# Patient Record
Sex: FEMALE | Race: WHITE | NOT HISPANIC OR LATINO | Employment: UNEMPLOYED | URBAN - METROPOLITAN AREA
[De-identification: names, ages, dates, MRNs, and addresses within clinical notes are randomized per-mention and may not be internally consistent; named-entity substitution may affect disease eponyms.]

---

## 2017-11-07 ENCOUNTER — TELEPHONE (OUTPATIENT)
Dept: PRIMARY CARE CLINIC | Facility: CLINIC | Age: 22
End: 2017-11-07

## 2017-11-07 NOTE — TELEPHONE ENCOUNTER
----- Message from Anjana Grande sent at 11/7/2017 10:17 AM CST -----  Contact: Patient  Jolene, patient [phone], Calling for same day appointment, cough, nausea, dizzy. Please advise. Thanks.

## 2019-01-14 LAB
ABO + RH BLD: NORMAL
C TRACH RRNA SPEC QL PROBE: NEGATIVE
HBV SURFACE AG SERPL QL IA: NEGATIVE
HIV 1+2 AB+HIV1 P24 AG SERPL QL IA: NEGATIVE
INDIRECT COOMBS: NEGATIVE
N GONORRHOEAE, AMPLIFIED DNA: NEGATIVE
RPR: NONREACTIVE
RUBELLA IMMUNE STATUS: NORMAL

## 2019-08-19 ENCOUNTER — HOSPITAL ENCOUNTER (OUTPATIENT)
Facility: HOSPITAL | Age: 24
Discharge: HOME | End: 2019-08-19
Attending: OBSTETRICS & GYNECOLOGY | Admitting: OBSTETRICS & GYNECOLOGY
Payer: COMMERCIAL

## 2019-08-19 VITALS — SYSTOLIC BLOOD PRESSURE: 117 MMHG | HEART RATE: 90 BPM | DIASTOLIC BLOOD PRESSURE: 73 MMHG

## 2019-08-19 DIAGNOSIS — O26.899 ABDOMINAL PAIN AFFECTING PREGNANCY: ICD-10-CM

## 2019-08-19 DIAGNOSIS — R10.9 ABDOMINAL PAIN AFFECTING PREGNANCY: ICD-10-CM

## 2019-08-19 LAB
BACTERIA #/AREA URNS HPF: ABNORMAL /HPF
BILIRUB UR QL STRIP: NEGATIVE
CLARITY UR: CLEAR
COLOR UR: YELLOW
CTP QC/QA: YES
GLUCOSE UR QL STRIP: NEGATIVE
HGB UR QL STRIP: NEGATIVE
HYALINE CASTS #/AREA URNS LPF: 2 /LPF
KETONES UR QL STRIP: NEGATIVE
LEUKOCYTE ESTERASE UR QL STRIP: ABNORMAL
MICROSCOPIC COMMENT: ABNORMAL
NITRITE UR QL STRIP: NEGATIVE
PH UR STRIP: 7 [PH] (ref 5–8)
PROT UR QL STRIP: NEGATIVE
RBC #/AREA URNS HPF: 2 /HPF (ref 0–4)
RUPTURE OF MEMBRANE: NEGATIVE
SP GR UR STRIP: 1 (ref 1–1.03)
SQUAMOUS #/AREA URNS HPF: 3 /HPF
URN SPEC COLLECT METH UR: ABNORMAL
UROBILINOGEN UR STRIP-ACNC: NEGATIVE EU/DL
WBC #/AREA URNS HPF: 4 /HPF (ref 0–5)

## 2019-08-19 PROCEDURE — 96372 THER/PROPH/DIAG INJ SC/IM: CPT

## 2019-08-19 PROCEDURE — 63600175 PHARM REV CODE 636 W HCPCS: Performed by: OBSTETRICS & GYNECOLOGY

## 2019-08-19 PROCEDURE — 81001 URINALYSIS AUTO W/SCOPE: CPT

## 2019-08-19 PROCEDURE — 99211 OFF/OP EST MAY X REQ PHY/QHP: CPT | Mod: 25

## 2019-08-19 RX ORDER — FEXOFENADINE HCL 60 MG
60 TABLET ORAL DAILY
Status: ON HOLD | COMMUNITY
End: 2019-09-14 | Stop reason: HOSPADM

## 2019-08-19 RX ORDER — TERBUTALINE SULFATE 1 MG/ML
0.25 INJECTION SUBCUTANEOUS ONCE
Status: COMPLETED | OUTPATIENT
Start: 2019-08-19 | End: 2019-08-19

## 2019-08-19 RX ADMIN — TERBUTALINE SULFATE 0.25 MG: 1 INJECTION, SOLUTION SUBCUTANEOUS at 12:08

## 2019-08-19 NOTE — NURSING
1040- Pt presents to unit with c/o abd pain and back pain off and on since Saturday night. Oriented to 2301.     1047- pt states since her baby shower on Saturday, she has been experiencing back pain and abd cramping that seems to be more severe in the mornings. Pt also reports occasional leaking. Denies vaginal bleeding and reports fetal movement.     1053- ROM plus performed. Pt tolerated well.     1113- ROM plus negative.     1140- U/A results negative.     1222- Brethine given. Pt tolerated well.     1313- Dr. Mead on unit to review strip. Status update given. MD states ok to discharge.     1318- Discharge instructions reviewed with and signed by patient. Labor precautions given. Pt ambulated off unit with  in no apparent distress.

## 2019-08-19 NOTE — DISCHARGE INSTRUCTIONS
Call your Doctor if you have any of the following:  Temperature above 100 degrees  Nausea, vomiting and/or diarrhea  Severe headache, dizziness, or blurred vision  Notable increase in swelling of hands or feet  Notable swelling of face and lips  Difficulty, pain or burning with urination  Foul smelling vaginal discharge  Decreased fetal movement    Come to the hospital if you have any of the following symptoms:  Your water breaks  More than 4-6 contractions in 1 hour for 2 or more hours  Vaginal bleeding like a period    After 28 weeks, you should feel 10 distinct fetal movements within a 2 hour period.    It is recommended that you drink 1/2 a gallon of water each day.  Tea, Soda and Juice are  in addition to this.

## 2019-09-04 ENCOUNTER — HOSPITAL ENCOUNTER (OUTPATIENT)
Facility: HOSPITAL | Age: 24
Discharge: HOME | End: 2019-09-04
Attending: OBSTETRICS & GYNECOLOGY | Admitting: OBSTETRICS & GYNECOLOGY
Payer: COMMERCIAL

## 2019-09-04 VITALS — SYSTOLIC BLOOD PRESSURE: 107 MMHG | DIASTOLIC BLOOD PRESSURE: 66 MMHG | HEART RATE: 77 BPM

## 2019-09-04 DIAGNOSIS — R03.0 ELEVATED BP WITHOUT DIAGNOSIS OF HYPERTENSION: ICD-10-CM

## 2019-09-04 LAB
ALBUMIN SERPL BCP-MCNC: 3.2 G/DL (ref 3.5–5.2)
ALP SERPL-CCNC: 147 U/L (ref 55–135)
ALT SERPL W/O P-5'-P-CCNC: 36 U/L (ref 10–44)
ANION GAP SERPL CALC-SCNC: 10 MMOL/L (ref 8–16)
AST SERPL-CCNC: 26 U/L (ref 10–40)
BACTERIA #/AREA URNS HPF: ABNORMAL /HPF
BILIRUB SERPL-MCNC: 0.4 MG/DL (ref 0.1–1)
BILIRUB UR QL STRIP: NEGATIVE
BUN SERPL-MCNC: 7 MG/DL (ref 6–20)
CALCIUM SERPL-MCNC: 8.9 MG/DL (ref 8.7–10.5)
CHLORIDE SERPL-SCNC: 105 MMOL/L (ref 95–110)
CLARITY UR: ABNORMAL
CO2 SERPL-SCNC: 21 MMOL/L (ref 23–29)
COLOR UR: YELLOW
CREAT SERPL-MCNC: 0.5 MG/DL (ref 0.5–1.4)
ERYTHROCYTE [DISTWIDTH] IN BLOOD BY AUTOMATED COUNT: 14.3 % (ref 11.5–14.5)
EST. GFR  (AFRICAN AMERICAN): >60 ML/MIN/1.73 M^2
EST. GFR  (NON AFRICAN AMERICAN): >60 ML/MIN/1.73 M^2
GLUCOSE SERPL-MCNC: 80 MG/DL (ref 70–110)
GLUCOSE UR QL STRIP: NEGATIVE
HCT VFR BLD AUTO: 32.8 % (ref 37–48.5)
HGB BLD-MCNC: 11.1 G/DL (ref 12–16)
HGB UR QL STRIP: NEGATIVE
HYALINE CASTS #/AREA URNS LPF: 6 /LPF
KETONES UR QL STRIP: NEGATIVE
LEUKOCYTE ESTERASE UR QL STRIP: ABNORMAL
MCH RBC QN AUTO: 32.1 PG (ref 27–31)
MCHC RBC AUTO-ENTMCNC: 33.8 G/DL (ref 32–36)
MCV RBC AUTO: 95 FL (ref 82–98)
MICROSCOPIC COMMENT: ABNORMAL
NITRITE UR QL STRIP: NEGATIVE
PH UR STRIP: 7 [PH] (ref 5–8)
PLATELET # BLD AUTO: 204 K/UL (ref 150–350)
PMV BLD AUTO: 11 FL (ref 9.2–12.9)
POTASSIUM SERPL-SCNC: 3.9 MMOL/L (ref 3.5–5.1)
PROT SERPL-MCNC: 6.5 G/DL (ref 6–8.4)
PROT UR QL STRIP: NEGATIVE
RBC # BLD AUTO: 3.46 M/UL (ref 4–5.4)
RBC #/AREA URNS HPF: 1 /HPF (ref 0–4)
SODIUM SERPL-SCNC: 136 MMOL/L (ref 136–145)
SP GR UR STRIP: 1 (ref 1–1.03)
SQUAMOUS #/AREA URNS HPF: 10 /HPF
URN SPEC COLLECT METH UR: ABNORMAL
UROBILINOGEN UR STRIP-ACNC: NEGATIVE EU/DL
WBC # BLD AUTO: 12.23 K/UL (ref 3.9–12.7)
WBC #/AREA URNS HPF: 17 /HPF (ref 0–5)

## 2019-09-04 PROCEDURE — 36415 COLL VENOUS BLD VENIPUNCTURE: CPT

## 2019-09-04 PROCEDURE — 59025 FETAL NON-STRESS TEST: CPT

## 2019-09-04 PROCEDURE — 80053 COMPREHEN METABOLIC PANEL: CPT

## 2019-09-04 PROCEDURE — 85027 COMPLETE CBC AUTOMATED: CPT

## 2019-09-04 PROCEDURE — 81001 URINALYSIS AUTO W/SCOPE: CPT

## 2019-09-04 NOTE — NURSING
D/C instructions reviewed with pt and her S.O. including special emphasis on preE precautions. Pt and S.O. both verbalize understanding and deny questions at this time. Pt ambulated off unit in no apparent distress.    TANIA Hines RN   09/04/2019 11:54 AM

## 2019-09-04 NOTE — NURSING
0977--arrived to labor and delivery after being sent from office for elevated b/p at her OB visit today.  Orders for nst, labs and b/p monitoring. Denies any pre-e symtoms. Has had headaches the last week that were resolved with tylenol. Pt has history of sinus surgery x2.     1004--phlebotomist at bedside to draw cbc and cmp per md order. ua also sent to lab.     1055--called lab to check on cbc, cmp and ua.  stated that she just got the STAT labs in front of her. ua is running after being sent at 1000    1131--lab results and b/p given to Dr Mead via text.    5949--may d/c home per Dr Mead

## 2019-09-12 ENCOUNTER — HOSPITAL ENCOUNTER (INPATIENT)
Facility: HOSPITAL | Age: 24
LOS: 2 days | Discharge: HOME | End: 2019-09-14
Attending: OBSTETRICS & GYNECOLOGY | Admitting: OBSTETRICS & GYNECOLOGY
Payer: COMMERCIAL

## 2019-09-12 ENCOUNTER — ANESTHESIA EVENT (OUTPATIENT)
Dept: OBSTETRICS AND GYNECOLOGY | Facility: HOSPITAL | Age: 24
End: 2019-09-12
Payer: COMMERCIAL

## 2019-09-12 ENCOUNTER — ANESTHESIA (OUTPATIENT)
Dept: OBSTETRICS AND GYNECOLOGY | Facility: HOSPITAL | Age: 24
End: 2019-09-12
Payer: COMMERCIAL

## 2019-09-12 DIAGNOSIS — O42.90 PROM (PREMATURE RUPTURE OF MEMBRANES): ICD-10-CM

## 2019-09-12 LAB
ABO GROUP BLD: NORMAL
BASOPHILS # BLD AUTO: 0.04 K/UL (ref 0–0.2)
BASOPHILS NFR BLD: 0.3 % (ref 0–1.9)
BLD GP AB SCN CELLS X3 SERPL QL: NORMAL
DIFFERENTIAL METHOD: ABNORMAL
EOSINOPHIL # BLD AUTO: 0.1 K/UL (ref 0–0.5)
EOSINOPHIL NFR BLD: 0.5 % (ref 0–8)
ERYTHROCYTE [DISTWIDTH] IN BLOOD BY AUTOMATED COUNT: 14.2 % (ref 11.5–14.5)
HCT VFR BLD AUTO: 37.7 % (ref 37–48.5)
HGB BLD-MCNC: 12.4 G/DL (ref 12–16)
IMM GRANULOCYTES # BLD AUTO: 0.1 K/UL (ref 0–0.04)
IMM GRANULOCYTES NFR BLD AUTO: 0.8 % (ref 0–0.5)
LYMPHOCYTES # BLD AUTO: 1.6 K/UL (ref 1–4.8)
LYMPHOCYTES NFR BLD: 11.9 % (ref 18–48)
MCH RBC QN AUTO: 31.5 PG (ref 27–31)
MCHC RBC AUTO-ENTMCNC: 32.9 G/DL (ref 32–36)
MCV RBC AUTO: 96 FL (ref 82–98)
MONOCYTES # BLD AUTO: 0.8 K/UL (ref 0.3–1)
MONOCYTES NFR BLD: 6 % (ref 4–15)
NEUTROPHILS # BLD AUTO: 10.7 K/UL (ref 1.8–7.7)
NEUTROPHILS NFR BLD: 80.5 % (ref 38–73)
NRBC BLD-RTO: 0 /100 WBC
PLATELET # BLD AUTO: 202 K/UL (ref 150–350)
PMV BLD AUTO: 11.6 FL (ref 9.2–12.9)
RBC # BLD AUTO: 3.94 M/UL (ref 4–5.4)
RH BLD: NORMAL
RPR SER QL: NORMAL
WBC # BLD AUTO: 13.24 K/UL (ref 3.9–12.7)

## 2019-09-12 PROCEDURE — 63600175 PHARM REV CODE 636 W HCPCS: Performed by: ANESTHESIOLOGY

## 2019-09-12 PROCEDURE — 12000002 HC ACUTE/MED SURGE SEMI-PRIVATE ROOM

## 2019-09-12 PROCEDURE — 86850 RBC ANTIBODY SCREEN: CPT

## 2019-09-12 PROCEDURE — 86592 SYPHILIS TEST NON-TREP QUAL: CPT

## 2019-09-12 PROCEDURE — 72200004 HC VAGINAL DELIVERY LEVEL I

## 2019-09-12 PROCEDURE — 62326 NJX INTERLAMINAR LMBR/SAC: CPT | Performed by: ANESTHESIOLOGY

## 2019-09-12 PROCEDURE — 85025 COMPLETE CBC W/AUTO DIFF WBC: CPT

## 2019-09-12 PROCEDURE — 25000003 PHARM REV CODE 250: Performed by: OBSTETRICS & GYNECOLOGY

## 2019-09-12 PROCEDURE — 86900 BLOOD TYPING SEROLOGIC ABO: CPT

## 2019-09-12 PROCEDURE — 27000676 HC TUBING PRIMARY PLUMSET: Performed by: ANESTHESIOLOGY

## 2019-09-12 PROCEDURE — C1751 CATH, INF, PER/CENT/MIDLINE: HCPCS | Performed by: ANESTHESIOLOGY

## 2019-09-12 PROCEDURE — 86901 BLOOD TYPING SEROLOGIC RH(D): CPT

## 2019-09-12 PROCEDURE — 63600175 PHARM REV CODE 636 W HCPCS: Performed by: OBSTETRICS & GYNECOLOGY

## 2019-09-12 RX ORDER — EPHEDRINE SULFATE 50 MG/ML
5 INJECTION, SOLUTION INTRAVENOUS ONCE AS NEEDED
Status: DISCONTINUED | OUTPATIENT
Start: 2019-09-12 | End: 2019-09-12

## 2019-09-12 RX ORDER — NALOXONE HCL 0.4 MG/ML
0.4 VIAL (ML) INJECTION SEE ADMIN INSTRUCTIONS
Status: DISCONTINUED | OUTPATIENT
Start: 2019-09-12 | End: 2019-09-12

## 2019-09-12 RX ORDER — IBUPROFEN 400 MG/1
800 TABLET ORAL EVERY 6 HOURS PRN
Status: DISCONTINUED | OUTPATIENT
Start: 2019-09-12 | End: 2019-09-14 | Stop reason: HOSPADM

## 2019-09-12 RX ORDER — DIPHENHYDRAMINE HYDROCHLORIDE 50 MG/ML
12.5 INJECTION INTRAMUSCULAR; INTRAVENOUS EVERY 4 HOURS PRN
Status: DISCONTINUED | OUTPATIENT
Start: 2019-09-12 | End: 2019-09-13

## 2019-09-12 RX ORDER — FENTANYL/BUPIVACAINE/NS/PF 2-625MCG/1
PLASTIC BAG, INJECTION (ML) INJECTION CONTINUOUS
Status: DISCONTINUED | OUTPATIENT
Start: 2019-09-12 | End: 2019-09-12

## 2019-09-12 RX ORDER — SODIUM CHLORIDE, SODIUM LACTATE, POTASSIUM CHLORIDE, CALCIUM CHLORIDE 600; 310; 30; 20 MG/100ML; MG/100ML; MG/100ML; MG/100ML
INJECTION, SOLUTION INTRAVENOUS CONTINUOUS
Status: DISCONTINUED | OUTPATIENT
Start: 2019-09-12 | End: 2019-09-12

## 2019-09-12 RX ORDER — ROPIVACAINE HYDROCHLORIDE 2 MG/ML
INJECTION, SOLUTION EPIDURAL; INFILTRATION
Status: DISCONTINUED | OUTPATIENT
Start: 2019-09-12 | End: 2019-09-12

## 2019-09-12 RX ORDER — ONDANSETRON 4 MG/1
8 TABLET, ORALLY DISINTEGRATING ORAL EVERY 8 HOURS PRN
Status: DISCONTINUED | OUTPATIENT
Start: 2019-09-12 | End: 2019-09-14 | Stop reason: HOSPADM

## 2019-09-12 RX ORDER — MISOPROSTOL 100 UG/1
600 TABLET ORAL
Status: DISCONTINUED | OUTPATIENT
Start: 2019-09-12 | End: 2019-09-13

## 2019-09-12 RX ORDER — SIMETHICONE 80 MG
1 TABLET,CHEWABLE ORAL 4 TIMES DAILY PRN
Status: DISCONTINUED | OUTPATIENT
Start: 2019-09-12 | End: 2019-09-13

## 2019-09-12 RX ORDER — DIPHENHYDRAMINE HCL 25 MG
25 CAPSULE ORAL ONCE
Status: COMPLETED | OUTPATIENT
Start: 2019-09-12 | End: 2019-09-12

## 2019-09-12 RX ORDER — SODIUM CHLORIDE 9 MG/ML
INJECTION, SOLUTION INTRAVENOUS
Status: DISCONTINUED | OUTPATIENT
Start: 2019-09-12 | End: 2019-09-12

## 2019-09-12 RX ORDER — ONDANSETRON 2 MG/ML
4 INJECTION INTRAMUSCULAR; INTRAVENOUS ONCE
Status: DISCONTINUED | OUTPATIENT
Start: 2019-09-12 | End: 2019-09-13

## 2019-09-12 RX ORDER — CALCIUM CARBONATE 200(500)MG
500 TABLET,CHEWABLE ORAL 3 TIMES DAILY PRN
Status: DISCONTINUED | OUTPATIENT
Start: 2019-09-12 | End: 2019-09-13

## 2019-09-12 RX ORDER — OXYTOCIN-SODIUM CHLORIDE 0.9% IV SOLN 30 UNIT/500ML 30-0.9/5 UT/ML-%
2 SOLUTION INTRAVENOUS CONTINUOUS
Status: DISCONTINUED | OUTPATIENT
Start: 2019-09-12 | End: 2019-09-12

## 2019-09-12 RX ADMIN — ROPIVACAINE HYDROCHLORIDE 3 ML: 2 INJECTION, SOLUTION EPIDURAL; INFILTRATION at 06:09

## 2019-09-12 RX ADMIN — SODIUM CHLORIDE, SODIUM LACTATE, POTASSIUM CHLORIDE, CALCIUM CHLORIDE 1200 ML/HR: 600; 310; 30; 20 INJECTION, SOLUTION INTRAVENOUS at 05:09

## 2019-09-12 RX ADMIN — SODIUM CHLORIDE, SODIUM LACTATE, POTASSIUM CHLORIDE, AND CALCIUM CHLORIDE: .6; .31; .03; .02 INJECTION, SOLUTION INTRAVENOUS at 01:09

## 2019-09-12 RX ADMIN — DIPHENHYDRAMINE HYDROCHLORIDE 25 MG: 25 CAPSULE ORAL at 01:09

## 2019-09-12 RX ADMIN — ROPIVACAINE HYDROCHLORIDE 4 ML: 2 INJECTION, SOLUTION EPIDURAL; INFILTRATION at 06:09

## 2019-09-12 RX ADMIN — ROPIVACAINE HYDROCHLORIDE 2 ML: 2 INJECTION, SOLUTION EPIDURAL; INFILTRATION at 06:09

## 2019-09-12 RX ADMIN — SODIUM CHLORIDE, SODIUM LACTATE, POTASSIUM CHLORIDE, AND CALCIUM CHLORIDE 1000 ML: .6; .31; .03; .02 INJECTION, SOLUTION INTRAVENOUS at 05:09

## 2019-09-12 RX ADMIN — Medication 2 MILLI-UNITS/MIN: at 04:09

## 2019-09-12 RX ADMIN — IBUPROFEN 800 MG: 400 TABLET, FILM COATED ORAL at 08:09

## 2019-09-12 RX ADMIN — VANCOMYCIN HYDROCHLORIDE 1500 MG: 1 INJECTION, POWDER, LYOPHILIZED, FOR SOLUTION INTRAVENOUS at 11:09

## 2019-09-12 NOTE — ANESTHESIA PROCEDURE NOTES
Epidural    Patient location during procedure: OB   Reason for block: primary anesthetic   Diagnosis: Intrauterine pregnancy   Start time: 9/12/2019 5:57 PM  Timeout: 9/12/2019 5:56 PM  End time: 9/12/2019 6:15 PM    Staffing  Performing Provider: Alan Delgado Jr., MD  Authorizing Provider: Alan Delgado Jr., MD        Preanesthetic Checklist  Completed: patient identified, site marked, surgical consent, pre-op evaluation, timeout performed, IV checked, risks and benefits discussed, monitors and equipment checked, anesthesia consent given, hand hygiene performed and patient being monitored  Preparation  Patient position: sitting  Prep: Betadine and ChloraPrep  Patient monitoring: ECG and Blood Pressure  Epidural  Skin Anesthetic: lidocaine 1%  Skin Wheal: 3 mL  Administration type: continuous  Approach: midline  Interspace: L3-4    Injection technique: DELIA air  Needle and Epidural Catheter  Needle type: Tuohy   Needle gauge: 17  Needle length: 3.5 inches  Catheter type: springwound and multi-orifice  Catheter size: 19 G  Test dose: 3 mL of lidocaine 1.5% with Epi 1-to-200,000  Additional Documentation: incremental injection, negative aspiration for heme and CSF, no paresthesia on injection, no significant pain on injection, no significant complaints from patient and no signs/symptoms of IV or SA injection  Needle localization: anatomical landmarks  Assessment  Upper dermatomal levels - Left: T6  Right: T6   Dermatomal levels determined by pinch or prick  Ease of block: easy  Patient's tolerance of the procedure: no complaints and comfortable throughout block  Additional Notes  Unable to inject 1st epidural cath. Procedure repeated without difficulty with new cath inserted. Dosed as charted. No inadvertent dural puncture with Tuohy.

## 2019-09-12 NOTE — SUBJECTIVE & OBJECTIVE
Interval History:  Jolene is a 23 y.o.  at 38w2d. She is doing well. Patient admitted for spontaneous labor and SROM.  Feeling some pain with contractions, pt has not requested epidural yet.      Objective:     Vital Signs (Most Recent):  Temp: 97.9 °F (36.6 °C) (19 1029)  Pulse: 93 (19 1029)  Resp: 16 (19 1029)  BP: 132/73 (19 1029) Vital Signs (24h Range):  Temp:  [97.9 °F (36.6 °C)] 97.9 °F (36.6 °C)  Pulse:  [93] 93  Resp:  [16] 16  BP: (132)/(73) 132/73     Weight: 70.3 kg (155 lb)  Body mass index is 24.28 kg/m².    FHT: Cat 1 (reassuring)  TOCO:  Q 5 minutes    Cervical Exam:  Dilation:  5  Effacement:  85  Station: -2  Presentation: Vertex     Significant Labs:  Lab Results   Component Value Date    GROUPTRH A POS 2019    HEPBSAG Negative 2019       I have personallly reviewed all pertinent lab results from the last 24 hours.    Physical Exam

## 2019-09-12 NOTE — PROGRESS NOTES
VANCOMYCIN PHARMACOKINETIC NOTE:  Vancomycin Day # 1    Objective/Assessment:    Diagnosis/Indication for Vancomycin: Surgical Prophylaxis     23 y.o., female; Actual Body Weight = 70.3 kg (155 lb).    The patient has the following labs:     9/12/2019 CrCl cannot be calculated (Patient's most recent lab result is older than the maximum 7 days allowed.). Lab Results   Component Value Date    BUN 7 09/04/2019       Lab Results   Component Value Date    WBC 12.23 09/04/2019            Plan:@  Adjust vancomycin dose and/or frequency based on the patient's actual weight and renal function:  Initiate Vancomycin 1500 mg IV x1 dose then 125-0 mg IV every 12 hours.  Orders have been entered into patient's chart.        Vancomycin trough level has been ordered for 9/13 @ 23:00    Pharmacy will manage vancomycin therapy, monitor serum vancomycin levels, monitor renal function and adjust regimen as necessary.      Thank you for allowing us to participate in this patient's care.     Yennifer Ortega 9/12/2019 10:49 AM  Department of Pharmacy  Ext [phone]

## 2019-09-12 NOTE — ANESTHESIA PREPROCEDURE EVALUATION
09/12/2019  Jolene Escalante is a 23 y.o., female.    Pre-op Assessment    I have reviewed the Patient Summary Reports.    I have reviewed the Nursing Notes.   I have reviewed the Medications.     Review of Systems  Anesthesia Hx:  No problems with previous Anesthesia Denies Hx of Anesthetic complications  Neg history of prior surgery. Denies Family Hx of Anesthesia complications.   Denies Personal Hx of Anesthesia complications.   Social:  Non-Smoker, No Alcohol Use    Hematology/Oncology:  Hematology Normal   Oncology Normal     EENT/Dental:EENT/Dental Normal   Cardiovascular:  Cardiovascular Normal     Pulmonary:  Pulmonary Normal    Renal/:  Renal/ Normal     Hepatic/GI:  Hepatic/GI Normal    Musculoskeletal:  Musculoskeletal Normal    Neurological:   Thoracic outlet syndrome with left arm pain.   Endocrine:  Endocrine Normal    Dermatological:  Skin Normal    Psych:  Psychiatric Normal           Physical Exam  General:  Well nourished    Airway/Jaw/Neck:  Airway Findings: Mouth Opening: Normal Tongue: Normal  General Airway Assessment: Adult  Mallampati: II  Improves to II with phonation.  TM Distance: Normal, at least 6 cm  Jaw/Neck Findings:  Neck ROM: Normal ROM       Chest/Lungs:  Chest/Lungs Findings: Clear to auscultation, Normal Respiratory Rate     Heart/Vascular:  Heart Findings: Rate: Normal  Rhythm: Regular Rhythm  Sounds: Normal        Mental Status:  Mental Status Findings:  Cooperative, Alert and Oriented       Patient Active Problem List   Diagnosis    Arm pain    Abdominal pain affecting pregnancy    Elevated BP without diagnosis of hypertension    PROM (premature rupture of membranes)       No results found for this or any previous visit.     Lab Results   Component Value Date    WBC 13.24 (H) 09/12/2019    HGB 12.4 09/12/2019    HCT 37.7 09/12/2019    MCV 96 09/12/2019      09/12/2019     BMP  Lab Results   Component Value Date     09/04/2019    K 3.9 09/04/2019     09/04/2019    CO2 21 (L) 09/04/2019    BUN 7 09/04/2019    CREATININE 0.5 09/04/2019    CALCIUM 8.9 09/04/2019    ANIONGAP 10 09/04/2019    ESTGFRAFRICA >60.0 09/04/2019    EGFRNONAA >60.0 09/04/2019           Anesthesia Plan  Type of Anesthesia, risks & benefits discussed:  Anesthesia Type:  epidural  Patient's Preference:   Intra-op Monitoring Plan: standard ASA monitors  Intra-op Monitoring Plan Comments:   Post Op Pain Control Plan:   Post Op Pain Control Plan Comments:   Induction:    Beta Blocker:  Patient is not currently on a Beta-Blocker (No further documentation required).       Informed Consent: Patient understands risks and agrees with Anesthesia plan.  Questions answered. Anesthesia consent signed with patient.  ASA Score: 2     Day of Surgery Review of History & Physical:

## 2019-09-12 NOTE — PROGRESS NOTES
[facility]  Obstetrics  Labor Progress Note    Patient Name: Jolene Escalante  MRN: [id number]  Admission Date: 2019  Hospital Length of Stay: 0 days  Attending Physician: Merlyn Mead MD  Primary Care Provider: Nadeem Jacinto MD    Subjective:     Principal Problem:PROM (premature rupture of membranes)    Hospital Course:  No notes on file    Interval History:  Jolene is a 23 y.o.  at 38w2d. She is doing well. Patient admitted for spontaneous labor and SROM.  Feeling some pain with contractions, pt has not requested epidural yet.      Objective:     Vital Signs (Most Recent):  Temp: 97.9 °F (36.6 °C) (19 1029)  Pulse: 93 (19 1029)  Resp: 16 (19 1029)  BP: 132/73 (19 1029) Vital Signs (24h Range):  Temp:  [97.9 °F (36.6 °C)] 97.9 °F (36.6 °C)  Pulse:  [93] 93  Resp:  [16] 16  BP: (132)/(73) 132/73     Weight: 70.3 kg (155 lb)  Body mass index is 24.28 kg/m².    FHT: Cat 1 (reassuring)  TOCO:  Q 5 minutes    Cervical Exam:  Dilation:  5  Effacement:  85  Station: -2  Presentation: Vertex     Significant Labs:  Lab Results   Component Value Date    GROUPTRH A POS 2019    HEPBSAG Negative 2019       I have personallly reviewed all pertinent lab results from the last 24 hours.    Physical Exam    Assessment/Plan:     23 y.o. female  at 38w2d for:    * PROM (premature rupture of membranes)  IUP at 38.2 for spontaneous labor and SROM    Continue  Current management  Will start pitocin if no change at next cervical exam  Vancomycin for GBBS           Merlyn Mead MD  Obstetrics  [facility]

## 2019-09-12 NOTE — ASSESSMENT & PLAN NOTE
IUP at 38.2 for spontaneous labor and SROM    Continue  Current management  Will start pitocin if no change at next cervical exam  Vancomycin for GBBS

## 2019-09-13 LAB
BASOPHILS # BLD AUTO: 0.03 K/UL (ref 0–0.2)
BASOPHILS NFR BLD: 0.2 % (ref 0–1.9)
DIFFERENTIAL METHOD: ABNORMAL
EOSINOPHIL # BLD AUTO: 0.1 K/UL (ref 0–0.5)
EOSINOPHIL NFR BLD: 0.8 % (ref 0–8)
ERYTHROCYTE [DISTWIDTH] IN BLOOD BY AUTOMATED COUNT: 14.5 % (ref 11.5–14.5)
HCT VFR BLD AUTO: 29.8 % (ref 37–48.5)
HGB BLD-MCNC: 9.8 G/DL (ref 12–16)
IMM GRANULOCYTES # BLD AUTO: 0.08 K/UL (ref 0–0.04)
IMM GRANULOCYTES NFR BLD AUTO: 0.5 % (ref 0–0.5)
LYMPHOCYTES # BLD AUTO: 1.8 K/UL (ref 1–4.8)
LYMPHOCYTES NFR BLD: 12.5 % (ref 18–48)
MCH RBC QN AUTO: 31.9 PG (ref 27–31)
MCHC RBC AUTO-ENTMCNC: 32.9 G/DL (ref 32–36)
MCV RBC AUTO: 97 FL (ref 82–98)
MONOCYTES # BLD AUTO: 1.2 K/UL (ref 0.3–1)
MONOCYTES NFR BLD: 8.3 % (ref 4–15)
NEUTROPHILS # BLD AUTO: 11.3 K/UL (ref 1.8–7.7)
NEUTROPHILS NFR BLD: 77.7 % (ref 38–73)
NRBC BLD-RTO: 0 /100 WBC
PLATELET # BLD AUTO: 166 K/UL (ref 150–350)
PMV BLD AUTO: 11 FL (ref 9.2–12.9)
RBC # BLD AUTO: 3.07 M/UL (ref 4–5.4)
WBC # BLD AUTO: 14.6 K/UL (ref 3.9–12.7)

## 2019-09-13 PROCEDURE — 85025 COMPLETE CBC W/AUTO DIFF WBC: CPT

## 2019-09-13 PROCEDURE — 12000002 HC ACUTE/MED SURGE SEMI-PRIVATE ROOM

## 2019-09-13 PROCEDURE — 63600175 PHARM REV CODE 636 W HCPCS: Performed by: OBSTETRICS & GYNECOLOGY

## 2019-09-13 PROCEDURE — 25000003 PHARM REV CODE 250: Performed by: OBSTETRICS & GYNECOLOGY

## 2019-09-13 PROCEDURE — 36415 COLL VENOUS BLD VENIPUNCTURE: CPT

## 2019-09-13 RX ORDER — OXYCODONE AND ACETAMINOPHEN 5; 325 MG/1; MG/1
1 TABLET ORAL EVERY 4 HOURS PRN
Status: DISCONTINUED | OUTPATIENT
Start: 2019-09-13 | End: 2019-09-14 | Stop reason: HOSPADM

## 2019-09-13 RX ORDER — OXYCODONE AND ACETAMINOPHEN 10; 325 MG/1; MG/1
1 TABLET ORAL EVERY 4 HOURS PRN
Status: DISCONTINUED | OUTPATIENT
Start: 2019-09-13 | End: 2019-09-14 | Stop reason: HOSPADM

## 2019-09-13 RX ORDER — SIMETHICONE 80 MG
1 TABLET,CHEWABLE ORAL EVERY 6 HOURS PRN
Status: DISCONTINUED | OUTPATIENT
Start: 2019-09-13 | End: 2019-09-14 | Stop reason: HOSPADM

## 2019-09-13 RX ORDER — DOCUSATE SODIUM 100 MG/1
200 CAPSULE, LIQUID FILLED ORAL 2 TIMES DAILY PRN
Status: DISCONTINUED | OUTPATIENT
Start: 2019-09-13 | End: 2019-09-14 | Stop reason: HOSPADM

## 2019-09-13 RX ORDER — DIPHENHYDRAMINE HCL 25 MG
25 CAPSULE ORAL EVERY 4 HOURS PRN
Status: DISCONTINUED | OUTPATIENT
Start: 2019-09-13 | End: 2019-09-14 | Stop reason: HOSPADM

## 2019-09-13 RX ORDER — HYDROCORTISONE 25 MG/G
CREAM TOPICAL 3 TIMES DAILY PRN
Status: DISCONTINUED | OUTPATIENT
Start: 2019-09-13 | End: 2019-09-14 | Stop reason: HOSPADM

## 2019-09-13 RX ORDER — PRENATAL WITH FERROUS FUM AND FOLIC ACID 3080; 920; 120; 400; 22; 1.84; 3; 20; 10; 1; 12; 200; 27; 25; 2 [IU]/1; [IU]/1; MG/1; [IU]/1; MG/1; MG/1; MG/1; MG/1; MG/1; MG/1; UG/1; MG/1; MG/1; MG/1; MG/1
1 TABLET ORAL DAILY
Status: DISCONTINUED | OUTPATIENT
Start: 2019-09-13 | End: 2019-09-14 | Stop reason: HOSPADM

## 2019-09-13 RX ADMIN — DOCUSATE SODIUM 200 MG: 100 CAPSULE, LIQUID FILLED ORAL at 08:09

## 2019-09-13 RX ADMIN — IBUPROFEN 600 MG: 400 TABLET, FILM COATED ORAL at 02:09

## 2019-09-13 RX ADMIN — PRENATAL VIT W/ FE FUMARATE-FA TAB 27-0.8 MG 1 TABLET: 27-0.8 TAB at 08:09

## 2019-09-13 RX ADMIN — OXYCODONE HYDROCHLORIDE AND ACETAMINOPHEN 1 TABLET: 10; 325 TABLET ORAL at 12:09

## 2019-09-13 RX ADMIN — Medication: at 01:09

## 2019-09-13 RX ADMIN — BENZOCAINE AND LEVOMENTHOL: 200; 5 SPRAY TOPICAL at 01:09

## 2019-09-13 RX ADMIN — OXYCODONE HYDROCHLORIDE AND ACETAMINOPHEN 1 TABLET: 10; 325 TABLET ORAL at 08:09

## 2019-09-13 RX ADMIN — IBUPROFEN 600 MG: 400 TABLET, FILM COATED ORAL at 08:09
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
## 2019-09-13 NOTE — PLAN OF CARE
Problem: Adult Inpatient Plan of Care  Goal: Plan of Care Review  Outcome: Ongoing (interventions implemented as appropriate)  Pt with adequate pain control with po pain medication. Lochia minimal. Voiding without difficulty. VSS. Denies needs at this time. NAD noted.
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
## 2019-09-13 NOTE — SUBJECTIVE & OBJECTIVE
Hospital course: No notes on file    Interval History: s/p  PPD #1    She is doing well this morning. She is tolerating a regular diet without nausea or vomiting. She is voiding spontaneously. She is ambulating. She has passed flatus, and has not a BM. Vaginal bleeding is mild. She denies fever or chills. Abdominal pain is mild and controlled with oral medications. She is breastfeeding. She desires circumcision for her male baby: not applicable.    Objective:     Vital Signs (Most Recent):  Temp: 97.9 °F (36.6 °C) (19)  Pulse: 70 (19)  Resp: 20 (19)  BP: 110/69 (19)  SpO2: 98 % (19) Vital Signs (24h Range):  Temp:  [97.9 °F (36.6 °C)-98.9 °F (37.2 °C)] 97.9 °F (36.6 °C)  Pulse:  [] 70  Resp:  [16-20] 20  SpO2:  [97 %-98 %] 98 %  BP: (100-154)/(56-77) 110/69     Weight: 70.3 kg (155 lb)  Body mass index is 24.28 kg/m².      Intake/Output Summary (Last 24 hours) at 2019 0837  Last data filed at 2019 0600  Gross per 24 hour   Intake 1200 ml   Output 2800 ml   Net -1600 ml       Significant Labs:  Lab Results   Component Value Date    GROUPTRH A POS 2019    HEPBSAG Negative 2019     Recent Labs   Lab 19  0447   HGB 9.8*   HCT 29.8*       I have personallly reviewed all pertinent lab results from the last 24 hours.    Physical Exam:   Constitutional: She is oriented to person, place, and time. She appears well-developed and well-nourished.    HENT:   Head: Normocephalic and atraumatic.       Pulmonary/Chest: Effort normal.        Abdominal: Soft.     Genitourinary: Uterus normal.   Genitourinary Comments: Uterus firm below the umbilicus           Musculoskeletal: Moves all extremeties. She exhibits no edema or tenderness.       Neurological: She is alert and oriented to person, place, and time.    Skin: Skin is warm and dry.    Psychiatric: She has a normal mood and affect. Her behavior is normal.

## 2019-09-13 NOTE — PLAN OF CARE
Problem: Pain (Postpartum Vaginal Delivery)  Goal: Acceptable Pain Control  Outcome: Ongoing (interventions implemented as appropriate)  Pt pain controlled by prn meds

## 2019-09-13 NOTE — NURSING
1900-Dr. Mead at bedside, pt starting to push with each uc  2215-in and out cath done-800 ml of clear yellow urine, epidural cath dced with tip intact  2245-to room 2118 per wc, janine well

## 2019-09-13 NOTE — PROGRESS NOTES
[facility]  Obstetrics  Postpartum Progress Note    Patient Name: Jolene Escalante  MRN: [id number]  Admission Date: 2019  Hospital Length of Stay: 1 days  Attending Physician: Merlyn Mead MD  Primary Care Provider: Nadeem Jacinto MD    Subjective:     Principal Problem:PROM (premature rupture of membranes)    Hospital course: No notes on file    Interval History: s/p  PPD #1    She is doing well this morning. She is tolerating a regular diet without nausea or vomiting. She is voiding spontaneously. She is ambulating. She has passed flatus, and has not a BM. Vaginal bleeding is mild. She denies fever or chills. Abdominal pain is mild and controlled with oral medications. She is breastfeeding. She desires circumcision for her male baby: not applicable.    Objective:     Vital Signs (Most Recent):  Temp: 97.9 °F (36.6 °C) (19)  Pulse: 70 (19)  Resp: 20 (19)  BP: 110/69 (19)  SpO2: 98 % (19) Vital Signs (24h Range):  Temp:  [97.9 °F (36.6 °C)-98.9 °F (37.2 °C)] 97.9 °F (36.6 °C)  Pulse:  [] 70  Resp:  [16-20] 20  SpO2:  [97 %-98 %] 98 %  BP: (100-154)/(56-77) 110/69     Weight: 70.3 kg (155 lb)  Body mass index is 24.28 kg/m².      Intake/Output Summary (Last 24 hours) at 2019 0837  Last data filed at 2019 0600  Gross per 24 hour   Intake 1200 ml   Output 2800 ml   Net -1600 ml       Significant Labs:  Lab Results   Component Value Date    GROUPTRH A POS 2019    HEPBSAG Negative 2019     Recent Labs   Lab 19  0447   HGB 9.8*   HCT 29.8*       I have personallly reviewed all pertinent lab results from the last 24 hours.    Physical Exam:   Constitutional: She is oriented to person, place, and time. She appears well-developed and well-nourished.    HENT:   Head: Normocephalic and atraumatic.       Pulmonary/Chest: Effort normal.        Abdominal: Soft.     Genitourinary: Uterus normal.    Genitourinary Comments: Uterus firm below the umbilicus           Musculoskeletal: Moves all extremeties. She exhibits no edema or tenderness.       Neurological: She is alert and oriented to person, place, and time.    Skin: Skin is warm and dry.    Psychiatric: She has a normal mood and affect. Her behavior is normal.       Assessment/Plan:     23 y.o. female  for:    * PROM (premature rupture of membranes)  S/p , PPD #1, GBBS +  Doing well    Continue post partum care        Disposition: continue post partum care    Merlyn Mead MD  Obstetrics  [facility]

## 2019-09-13 NOTE — L&D DELIVERY NOTE
[facility]  Vaginal Delivery   Operative Note    SUMMARY     Normal spontaneous vaginal delivery of live infant, . The patient began pushing at c/c/+1.  After 15 mins of pushing, infant delivered in OA position.  Shoulders/body followed easily.  Infant placed on patient's abdomen with nursery nurse present.  Cord clamped and cut.  Placenta S/S/I.  Laceration repaired with 2- O vicryl.  Uterus firm below umbilicus.  Sponge, lap, needle counts correct.  The patient tolerated well.  apgars 9/9.    Infant delivered position OA over perineum.  Nuchal cord: No.    Spontaneous delivery of placenta and IV pitocin given noting good uterine tone.  left periurethral laceration repaired.  Patient tolerated delivery well. Sponge needle and lap counted correctly x2.    Indications: PROM (premature rupture of membranes)  Pregnancy complicated by:   Patient Active Problem List   Diagnosis    Arm pain    Abdominal pain affecting pregnancy    Elevated BP without diagnosis of hypertension    PROM (premature rupture of membranes)     Admitting GA: 38w2d    This patient has no babies on file.

## 2019-09-14 VITALS
OXYGEN SATURATION: 99 % | HEIGHT: 67 IN | RESPIRATION RATE: 18 BRPM | BODY MASS INDEX: 24.33 KG/M2 | DIASTOLIC BLOOD PRESSURE: 73 MMHG | SYSTOLIC BLOOD PRESSURE: 109 MMHG | TEMPERATURE: 98 F | WEIGHT: 155 LBS | HEART RATE: 76 BPM

## 2019-09-14 PROCEDURE — 25000003 PHARM REV CODE 250: Performed by: OBSTETRICS & GYNECOLOGY

## 2019-09-14 RX ORDER — IBUPROFEN 600 MG/1
600 TABLET ORAL EVERY 6 HOURS PRN
Refills: 0 | COMMUNITY
Start: 2019-09-14

## 2019-09-14 RX ORDER — DOCUSATE SODIUM 100 MG/1
200 CAPSULE, LIQUID FILLED ORAL 2 TIMES DAILY PRN
Refills: 0 | COMMUNITY
Start: 2019-09-14

## 2019-09-14 RX ORDER — OXYCODONE AND ACETAMINOPHEN 5; 325 MG/1; MG/1
1-2 TABLET ORAL EVERY 4 HOURS PRN
Qty: 30 TABLET | Refills: 0 | Status: SHIPPED | OUTPATIENT
Start: 2019-09-14

## 2019-09-14 RX ADMIN — IBUPROFEN 800 MG: 400 TABLET, FILM COATED ORAL at 08:09

## 2019-09-14 RX ADMIN — PRENATAL VIT W/ FE FUMARATE-FA TAB 27-0.8 MG 1 TABLET: 27-0.8 TAB at 08:09

## 2019-09-14 RX ADMIN — DOCUSATE SODIUM 200 MG: 100 CAPSULE, LIQUID FILLED ORAL at 08:09

## 2019-09-14 NOTE — DISCHARGE SUMMARY
[facility]  Discharge Summary  Obstetrics - Triage      Admit Date: 2019    Discharge Date and Time:  2019 11:39 AM    Attending Physician: Merlyn Mead MD     Discharge Provider: Merlyn Mead    Reason for Admission: IUP at 38 wga, spontaneous labor, spontaneous rupture of membranes    Hospital Course (synopsis of major diagnoses, care, treatment, and services provided during the course of the hospital stay):     Jolene Escalante is a 23 y.o.  female who presented to the office at 38 weeks complaining of leakage of vaginal fluid.  Her membranes were found to be ruptured and she was 4 cm dilated.  She was sent to Labor and delivery for spontaneous rupture of membranes and spontaneous labor.  She had a spontaneous vaginal delivery without complication.  Throughout her stay her vital signs are stable and she was afebrile.  Upon discharge she has no complaints.  Her bleeding is minimal.  She will be given discharge instructions and prescription for pain medication and she will follow up with me in 6 weeks.    Gen - NAD  Uterus - firm below umbilicus  Ext - no edema, no calf tenderness    She was admitted to the labor and delivery triage area. Vital signs on admit were: Temp: 97.9 °F (36.6 °C), Pulse: 93, Resp: 16, BP: 132/73, SpO2: 97 %.      Final Diagnoses:    Principal Problem: PROM (premature rupture of membranes)   Secondary Diagnoses: none    Discharged Condition: good    Disposition: Home or Self Care    Follow Up/Patient Instructions:     Medications:  Reconciled Home Medications:      Medication List      START taking these medications    benzocaine-lanolin 20-0.5 % Aero  Commonly known as:  DERMOPLAST  Apply topically continuous prn.     docusate sodium 100 MG capsule  Commonly known as:  COLACE  Take 2 capsules (200 mg total) by mouth 2 (two) times daily as needed for Constipation.     ibuprofen 600 MG tablet  Commonly known as:  ADVIL,MOTRIN  Take 1 tablet (600  mg total) by mouth every 6 (six) hours as needed (cramping).     lanolin Crea cream  Apply topically as needed for Dry Skin (to nipples).     oxyCODONE-acetaminophen 5-325 mg per tablet  Commonly known as:  PERCOCET  Take 1-2 tablets by mouth every 4 (four) hours as needed.        CONTINUE taking these medications    PRENATAL 1+1 ORAL  Take 1 tablet by mouth once daily.        STOP taking these medications    ALLEGRA ALLERGY 60 MG tablet  Generic drug:  fexofenadine          Discharge Procedure Orders   Diet Adult Regular     Lifting restrictions   Scheduling Instructions: No lifting greater than 10 # for 6 weeks     Other restrictions (specify):   Order Comments: Pelvic rest x 6 weeks     No driving until:   Order Comments: No driving for 2 weeks     Notify your health care provider if you experience any of the following:  temperature >100.4     Notify your health care provider if you experience any of the following:  persistent nausea and vomiting or diarrhea     Notify your health care provider if you experience any of the following:  severe uncontrolled pain     Notify your health care provider if you experience any of the following:  redness, tenderness, or signs of infection (pain, swelling, redness, odor or green/yellow discharge around incision site)     Notify your health care provider if you experience any of the following:  difficulty breathing or increased cough     Notify your health care provider if you experience any of the following:  severe persistent headache     Notify your health care provider if you experience any of the following:  worsening rash     Notify your health care provider if you experience any of the following:  persistent dizziness, light-headedness, or visual disturbances     Notify your health care provider if you experience any of the following:  increased confusion or weakness     Notify your health care provider if you experience any of the following:   Order Comments: Call  provider for any heavy vaginal bleeding     Shower on day dressing removed (No bath)     Follow-up Information     Merlyn Mead MD In 6 weeks.    Specialty:  Obstetrics  Why:  POSTPARTAL FOLLOW UP  Contact information:  [address] OBSTETRICS & GYNECOLOGY  [facility] [address]  [phone]             Merlyn Mead MD. Schedule an appointment as soon as possible for a visit in 6 weeks.    Specialty:  Obstetrics  Why:  For follow up  Contact information:  [address] OBSTETRICS & GYNECOLOGY  [facility] [address]  [phone]

## 2019-09-14 NOTE — NURSING
Dr Lindsey on floor for eval. Pt cleared from his standpoint for discharge as well as Dr Bonilla. Pt already had discharge papers in hand, denies further needs. NAD noted

## 2019-09-14 NOTE — PLAN OF CARE
Problem: Adult Inpatient Plan of Care  Goal: Plan of Care Review  Outcome: Outcome(s) achieved Date Met: 09/14/19  Pt with adequate pain control with po pain medication. Lochia minimal. Voiding without difficulty. VSS. Denies needs at this time.NAD noted.

## 2019-09-14 NOTE — NURSING
"Dr Lindsey notified of pt c/o right leg pain and numbness.  Pt reports that it started "a few hours ago." MD en route to floor for evaluation.    Dr Mead also notified, reports that she can be discharged as planned if cleared by anesthesiologist.  "

## 2019-09-14 NOTE — DISCHARGE INSTRUCTIONS
FOLLOW UP WITH YOUR DOCTOR IN 4-6 WEEKS OR SOONER FOR ANY PROBLEMS.    Pelvic rest for 6 weeks (no sex, tampons, douching, nothing in the vagina)   You can experience vaginal bleeding on and off for up to 6 weeks, it will gradually get lighter and the color will change from bright red to a brownish discharge towards the end.     Activity:   NO strenuous activities or exercising. Do not /lift anything over 15 pounds. Do not do heavy housework or cleaning.   NO driving for 3 days. You may take short car trips but do not drive.   You may shower and/or soak in a bathtub, both are acceptable. Use a mild soap, no heavy perfumes or fragrances to avoid irritation.   If constipation develops: You may take Colace (stool softener), Milk of Magnesia, Dulcolax or Miralax. All of these medications are sold over the counter.     Care of Episiotomy:   Local agents such as Tucks pads & Dermoplast spray. You may also use a Sitz bath: sitting in a tub of warm water for 15 minutes 2-3 times per day will help relieve the discomfort.     Pain Relief:   You may take Motrin for mild pain & uterine cramping.     Emotional Changes:   You may experience baby blues after delivery. You may feel let down, anxious and cry easily. This is normal. These feelings can begin 2-3 days after delivery and usually disappear in about a week or two. Prolonged sadness may indicate postpartum depression.     Call your doctor for any of the following:   Difficulty breathing, problems with any of your medications, inability to eat.   Foul smelling vaginal discharge.   Temperature above 100.4.   Heavy vaginal bleeding. All women bleed different after delivery and each delivery is different. Heavy bleeding consists of saturating a j luis pad in a 1 hour time period. Passing clots are normal, if you pass a blood clot larger than the size of a golf ball call your doctor's office.   If you experience pain in your legs/calves, if one leg increases in size and  becomes swollen or becomes hot to touch or discolored.   Crying or periods of sadness beyond 2 weeks.     If you are breast feeding:   Wash your breasts with mild soap and warm water.   You should wear a supportive bra.   You should continue to take a prenatal vitamin for 6 weeks or until breastfeeding is discontinued.   If nipples are sore, apply a few drops of breast milk after nursing and let air dry or you can use Lanolin cream.   If breasts are engorged, apply warmth and express milk.   [facility] lactation consultant is available at [phone] for your breastfeeding needs.    If you are not breastfeeding:   Wear a tight bra and do not stimulate your breasts. Avoid handling your breasts and do not express milk. You may apply ice packs or cabbage leaves to relieve discomfort from engorgement. If your breasts become warm to touch, reddened or lumps develop call your doctor.        Breastfeeding Discharge Instructions       [facility] [phone]   AAP recommendation of exclusive breastfeeding for the first 6 months of life and continued breastfeeding with the introduction of supplemental foods beyond the first year of life.  Instructed on the recommendation to delay all bottle and pacifier use until after 4 weeks of age and breastfeeding is well established.  Discussed the benefits of exclusive breastfeeding for both mother and baby.  Discussed the risks of supplementation/pacifier use on the exclusivity of breastfeeding in the first 6 months. Feed the baby at the earliest sign of hunger or comfort  o Hands to mouth, sucking motions  o Rooting or searching for something to suck on  o Dont wait for crying - it is a not a late sign of hunger; it is a sign of distress     The feedings may be 8-12 times per 24hrs and will not follow a schedule   Alternate the breast you start the feeding with, or start with the breast that feels the fullest   Switch breasts when the baby  takes himself off the breast or falls asleep   Keep offering breasts until the baby looks full, no longer gives hunger signs, and stays asleep when placed on his back in the crib   If the baby is sleepy and wont wake for a feeding, put the baby skin-to-skin dressed in a diaper against the mothers bare chest   Sleep near your baby   The baby should be positioned and latched on to the breast correctly  o Chest-to-chest, chin in the breast  o Babys lips are flipped outward  o Babys mouth is stretched open wide like a shout  o Babys sucking should feel like tugging to the mother  - The baby should be drinking at the breast:  o You should hear swallowing or gulping throughout the feeding  o You should see milk on the babys lips when he comes off the breast  o Your breasts should be softer when the baby is finished feeding  o The baby should look relaxed at the end of feedings  o After the 4th day and your milk is in:  o The babys poop should turn bright yellow and be loose, watery, and seedy  o The baby should have at least 3-4 poops the size of the palm of your hand per day  o The baby should have at least 6-8 wet diapers per day  o The urine should be light yellow in color  You should drink when you are thirsty and eat a healthy diet when you are    hungry.     Take naps to get the rest you need.   Take medications and/or drink alcohol only with permission of your obstetrician    or the babys pediatrician.  You can also call the [facility],   ([phone]), Monday-Friday, 8am-5pm Central time, to get the most   up-to-date evidence-based information on the use of medications during   pregnancy and breastfeeding.      The baby should be examined by a pediatrician at 3-5 days of age; unless ordered sooner by the pediatrician.  Once your milk comes in, the baby should be back to birth weight no later than 10-14 days of age.    If youre having problems with breastfeeding or have any questions  regarding breastfeeding- call [facility] Breastfeeding Support services [phone]

## 2024-12-06 ENCOUNTER — OFFICE VISIT (OUTPATIENT)
Dept: FAMILY MEDICINE | Facility: CLINIC | Age: 29
End: 2024-12-06
Payer: COMMERCIAL

## 2024-12-06 ENCOUNTER — LAB VISIT (OUTPATIENT)
Dept: LAB | Facility: HOSPITAL | Age: 29
End: 2024-12-06
Payer: COMMERCIAL

## 2024-12-06 ENCOUNTER — TELEPHONE (OUTPATIENT)
Dept: FAMILY MEDICINE | Facility: CLINIC | Age: 29
End: 2024-12-06

## 2024-12-06 VITALS
OXYGEN SATURATION: 98 % | HEIGHT: 67 IN | BODY MASS INDEX: 24.22 KG/M2 | WEIGHT: 154.31 LBS | HEART RATE: 85 BPM | SYSTOLIC BLOOD PRESSURE: 132 MMHG | DIASTOLIC BLOOD PRESSURE: 84 MMHG

## 2024-12-06 DIAGNOSIS — Z87.892 HISTORY OF ANAPHYLACTIC SHOCK: ICD-10-CM

## 2024-12-06 DIAGNOSIS — K12.0 CANKER SORES ORAL: ICD-10-CM

## 2024-12-06 DIAGNOSIS — G89.29 TOE PAIN, CHRONIC, LEFT: ICD-10-CM

## 2024-12-06 DIAGNOSIS — Z00.00 ENCOUNTER FOR GENERAL ADULT MEDICAL EXAMINATION W/O ABNORMAL FINDINGS: ICD-10-CM

## 2024-12-06 DIAGNOSIS — Z91.018 MULTIPLE FOOD ALLERGIES: ICD-10-CM

## 2024-12-06 DIAGNOSIS — M25.50 ARTHRALGIA, UNSPECIFIED JOINT: ICD-10-CM

## 2024-12-06 DIAGNOSIS — D80.2 IMMUNOGLOBULIN A DEFICIENCY: ICD-10-CM

## 2024-12-06 DIAGNOSIS — D80.3 IMMUNOGLOBULIN G DEFICIENCY: ICD-10-CM

## 2024-12-06 DIAGNOSIS — R82.90 FOUL SMELLING URINE: ICD-10-CM

## 2024-12-06 DIAGNOSIS — M25.40 JOINT SWELLING: ICD-10-CM

## 2024-12-06 DIAGNOSIS — R21 FACIAL RASH: ICD-10-CM

## 2024-12-06 DIAGNOSIS — Z00.00 ENCOUNTER FOR GENERAL ADULT MEDICAL EXAMINATION W/O ABNORMAL FINDINGS: Primary | ICD-10-CM

## 2024-12-06 DIAGNOSIS — G54.0 THORACIC OUTLET SYNDROME ASSOCIATED WITH CERVICAL RIB: ICD-10-CM

## 2024-12-06 DIAGNOSIS — M79.675 TOE PAIN, CHRONIC, LEFT: ICD-10-CM

## 2024-12-06 DIAGNOSIS — Q76.5 THORACIC OUTLET SYNDROME ASSOCIATED WITH CERVICAL RIB: ICD-10-CM

## 2024-12-06 PROBLEM — R10.9 ABDOMINAL PAIN AFFECTING PREGNANCY: Status: RESOLVED | Noted: 2019-08-19 | Resolved: 2024-12-06

## 2024-12-06 PROBLEM — O26.899 ABDOMINAL PAIN AFFECTING PREGNANCY: Status: RESOLVED | Noted: 2019-08-19 | Resolved: 2024-12-06

## 2024-12-06 PROBLEM — O42.90 PROM (PREMATURE RUPTURE OF MEMBRANES): Status: RESOLVED | Noted: 2019-09-12 | Resolved: 2024-12-06

## 2024-12-06 LAB
ALBUMIN SERPL BCP-MCNC: 4.5 G/DL (ref 3.5–5.2)
ALP SERPL-CCNC: 52 U/L (ref 40–150)
ALT SERPL W/O P-5'-P-CCNC: 26 U/L (ref 10–44)
ANION GAP SERPL CALC-SCNC: 11 MMOL/L (ref 8–16)
AST SERPL-CCNC: 28 U/L (ref 10–40)
BASOPHILS # BLD AUTO: 0.06 K/UL (ref 0–0.2)
BASOPHILS NFR BLD: 0.5 % (ref 0–1.9)
BILIRUB SERPL-MCNC: 0.2 MG/DL (ref 0.1–1)
BUN SERPL-MCNC: 9 MG/DL (ref 6–20)
CALCIUM SERPL-MCNC: 9.5 MG/DL (ref 8.7–10.5)
CHLORIDE SERPL-SCNC: 104 MMOL/L (ref 95–110)
CHOLEST SERPL-MCNC: 175 MG/DL (ref 120–199)
CHOLEST/HDLC SERPL: 3 {RATIO} (ref 2–5)
CO2 SERPL-SCNC: 22 MMOL/L (ref 23–29)
CREAT SERPL-MCNC: 0.8 MG/DL (ref 0.5–1.4)
DIFFERENTIAL METHOD BLD: ABNORMAL
EOSINOPHIL # BLD AUTO: 0.1 K/UL (ref 0–0.5)
EOSINOPHIL NFR BLD: 1.1 % (ref 0–8)
ERYTHROCYTE [DISTWIDTH] IN BLOOD BY AUTOMATED COUNT: 12.8 % (ref 11.5–14.5)
EST. GFR  (NO RACE VARIABLE): >60 ML/MIN/1.73 M^2
ESTIMATED AVG GLUCOSE: 94 MG/DL (ref 68–131)
GLUCOSE SERPL-MCNC: 105 MG/DL (ref 70–110)
HBA1C MFR BLD: 4.9 % (ref 4–5.6)
HCT VFR BLD AUTO: 40.3 % (ref 37–48.5)
HDLC SERPL-MCNC: 58 MG/DL (ref 40–75)
HDLC SERPL: 33.1 % (ref 20–50)
HGB BLD-MCNC: 13.7 G/DL (ref 12–16)
IMM GRANULOCYTES # BLD AUTO: 0.04 K/UL (ref 0–0.04)
IMM GRANULOCYTES NFR BLD AUTO: 0.3 % (ref 0–0.5)
LDLC SERPL CALC-MCNC: 97.6 MG/DL (ref 63–159)
LYMPHOCYTES # BLD AUTO: 2.5 K/UL (ref 1–4.8)
LYMPHOCYTES NFR BLD: 19.4 % (ref 18–48)
MCH RBC QN AUTO: 32.6 PG (ref 27–31)
MCHC RBC AUTO-ENTMCNC: 34 G/DL (ref 32–36)
MCV RBC AUTO: 96 FL (ref 82–98)
MONOCYTES # BLD AUTO: 0.7 K/UL (ref 0.3–1)
MONOCYTES NFR BLD: 5.7 % (ref 4–15)
NEUTROPHILS # BLD AUTO: 9.3 K/UL (ref 1.8–7.7)
NEUTROPHILS NFR BLD: 73 % (ref 38–73)
NONHDLC SERPL-MCNC: 117 MG/DL
NRBC BLD-RTO: 0 /100 WBC
PLATELET # BLD AUTO: 362 K/UL (ref 150–450)
PMV BLD AUTO: 11 FL (ref 9.2–12.9)
POTASSIUM SERPL-SCNC: 4.7 MMOL/L (ref 3.5–5.1)
PROT SERPL-MCNC: 7.8 G/DL (ref 6–8.4)
RBC # BLD AUTO: 4.2 M/UL (ref 4–5.4)
SODIUM SERPL-SCNC: 137 MMOL/L (ref 136–145)
TRIGL SERPL-MCNC: 97 MG/DL (ref 30–150)
TSH SERPL DL<=0.005 MIU/L-ACNC: 1.4 UIU/ML (ref 0.4–4)
WBC # BLD AUTO: 12.72 K/UL (ref 3.9–12.7)

## 2024-12-06 PROCEDURE — 80053 COMPREHEN METABOLIC PANEL: CPT

## 2024-12-06 PROCEDURE — 84443 ASSAY THYROID STIM HORMONE: CPT

## 2024-12-06 PROCEDURE — 86431 RHEUMATOID FACTOR QUANT: CPT

## 2024-12-06 PROCEDURE — 86038 ANTINUCLEAR ANTIBODIES: CPT

## 2024-12-06 PROCEDURE — 85025 COMPLETE CBC W/AUTO DIFF WBC: CPT

## 2024-12-06 PROCEDURE — 99999 PR PBB SHADOW E&M-NEW PATIENT-LVL IV: CPT | Mod: PBBFAC,,,

## 2024-12-06 PROCEDURE — 83036 HEMOGLOBIN GLYCOSYLATED A1C: CPT

## 2024-12-06 PROCEDURE — 80061 LIPID PANEL: CPT

## 2024-12-06 RX ORDER — DICLOFENAC SODIUM 10 MG/G
2 GEL TOPICAL 4 TIMES DAILY
Qty: 100 G | Refills: 5 | Status: SHIPPED | OUTPATIENT
Start: 2024-12-06

## 2024-12-06 RX ORDER — EPINEPHRINE 0.3 MG/.3ML
2 INJECTION SUBCUTANEOUS ONCE
Qty: 0.6 ML | Refills: 11 | Status: SHIPPED | OUTPATIENT
Start: 2024-12-06 | End: 2024-12-06
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
## 2024-12-06 NOTE — TELEPHONE ENCOUNTER
----- Message from Jolynn sent at 12/6/2024  4:41 PM CST -----  Contact: Pharmacy  Type:  Pharmacy Calling to Clarify an RX    Name of Caller:Pharmacy    Pharmacy Name:    [id number] - [place] [id number] [place] [address] AT [facility] [address]  [address] [address] [address]  Phone: [phone] Fax: [phone]      Prescription Name:EPINEPHrine (EPIPEN 2-TONE) 0.3 mg/0.3 mL AtIn    What do they need to clarify?:Patient normal dose is 0.3, directions state to inject 0.6. Please clarify     Best Call Back Number:see above     Additional Information: Please advise

## 2024-12-09 ENCOUNTER — PATIENT MESSAGE (OUTPATIENT)
Dept: FAMILY MEDICINE | Facility: CLINIC | Age: 29
End: 2024-12-09
Payer: COMMERCIAL

## 2024-12-09 ENCOUNTER — TELEPHONE (OUTPATIENT)
Dept: HEMATOLOGY/ONCOLOGY | Facility: CLINIC | Age: 29
End: 2024-12-09
Payer: COMMERCIAL

## 2024-12-09 ENCOUNTER — OFFICE VISIT (OUTPATIENT)
Dept: UROLOGY | Facility: CLINIC | Age: 29
End: 2024-12-09
Payer: COMMERCIAL

## 2024-12-09 VITALS — BODY MASS INDEX: 24.26 KG/M2 | WEIGHT: 154.56 LBS | HEIGHT: 67 IN

## 2024-12-09 DIAGNOSIS — Z87.892 HISTORY OF ANAPHYLACTIC SHOCK: ICD-10-CM

## 2024-12-09 DIAGNOSIS — R82.90 FOUL SMELLING URINE: ICD-10-CM

## 2024-12-09 DIAGNOSIS — R39.11 URINARY HESITANCY: ICD-10-CM

## 2024-12-09 DIAGNOSIS — D80.3 IMMUNOGLOBULIN G DEFICIENCY: ICD-10-CM

## 2024-12-09 DIAGNOSIS — R33.9 INCOMPLETE BLADDER EMPTYING: Primary | ICD-10-CM

## 2024-12-09 DIAGNOSIS — D80.2 IMMUNOGLOBULIN A DEFICIENCY: ICD-10-CM

## 2024-12-09 DIAGNOSIS — D72.829 LEUKOCYTOSIS, UNSPECIFIED TYPE: Primary | ICD-10-CM

## 2024-12-09 LAB
ANA SER QL IF: NORMAL
BILIRUBIN, UA POC OHS: NEGATIVE
BLOOD, UA POC OHS: NEGATIVE
CLARITY, UA POC OHS: ABNORMAL
COLOR, UA POC OHS: YELLOW
GLUCOSE, UA POC OHS: NEGATIVE
KETONES, UA POC OHS: NEGATIVE
LEUKOCYTES, UA POC OHS: NEGATIVE
NITRITE, UA POC OHS: POSITIVE
PH, UA POC OHS: 7.5
POC RESIDUAL URINE VOLUME: 369 ML (ref 0–100)
PROTEIN, UA POC OHS: ABNORMAL
RHEUMATOID FACT SERPL-ACNC: <13 IU/ML (ref 0–15)
SPECIFIC GRAVITY, UA POC OHS: 1.02
UROBILINOGEN, UA POC OHS: 0.2

## 2024-12-09 PROCEDURE — 1159F MED LIST DOCD IN RCRD: CPT | Mod: CPTII,S$GLB,,

## 2024-12-09 PROCEDURE — 81003 URINALYSIS AUTO W/O SCOPE: CPT | Mod: QW,S$GLB,,

## 2024-12-09 PROCEDURE — 99999 PR PBB SHADOW E&M-EST. PATIENT-LVL III: CPT | Mod: PBBFAC,,,

## 2024-12-09 PROCEDURE — 3008F BODY MASS INDEX DOCD: CPT | Mod: CPTII,S$GLB,,

## 2024-12-09 PROCEDURE — 3044F HG A1C LEVEL LT 7.0%: CPT | Mod: CPTII,S$GLB,,

## 2024-12-09 PROCEDURE — 51798 US URINE CAPACITY MEASURE: CPT | Mod: S$GLB,,,

## 2024-12-09 PROCEDURE — 87088 URINE BACTERIA CULTURE: CPT

## 2024-12-09 PROCEDURE — 87086 URINE CULTURE/COLONY COUNT: CPT

## 2024-12-09 PROCEDURE — 87186 SC STD MICRODIL/AGAR DIL: CPT

## 2024-12-09 PROCEDURE — 1160F RVW MEDS BY RX/DR IN RCRD: CPT | Mod: CPTII,S$GLB,,

## 2024-12-09 PROCEDURE — 99203 OFFICE O/P NEW LOW 30 MIN: CPT | Mod: S$GLB,,,

## 2024-12-09 RX ORDER — EPINEPHRINE 0.3 MG/.3ML
2 INJECTION SUBCUTANEOUS ONCE
Qty: 0.3 ML | Refills: 11 | Status: SHIPPED | OUTPATIENT
Start: 2024-12-09 | End: 2024-12-09

## 2024-12-09 NOTE — PROGRESS NOTES
[facility] Note  Staff: Renetta Stanton FNP-C    PCP: MD Dayday    Chief Complaint: Malodorous Urine    Subjective:        HPI: Jolene Escalante is a 29 y.o. female NEW PATIENT presents today for evaluation of malodorous urine. She states this has been happening for months. She states at times her urine smells like a chemical. She denies dysuria, hematuria, fever, flank pain, urgency, and frequency. She states she does at times feel her stream is hesitant to start and she had to push to finish urination.     She states she does have a urological history requiring urethral dilation over 10 years ago. It appears she was followed by Dr. Robert but these records are not available to be reviewed.     Questions asked pt during office visit today:  Urgency:No, incontinence with urgency? No;   DysuriaNo  Gross HematuriaNo    History of Kidney Stones?:  no    Constipation issues?:  no    PVR by bladder scan performed by MA today:  369 mL    REVIEW OF SYSTEMS:  Review of Systems   Constitutional: Negative.  Negative for chills and fever.   Gastrointestinal: Negative.  Negative for abdominal pain, constipation, diarrhea, nausea and vomiting.   Genitourinary: Negative.  Negative for dysuria, flank pain, frequency, hematuria and urgency.   Musculoskeletal: Negative.  Negative for back pain.       PMHx:  Past Medical History:   Diagnosis Date    Abdominal pain affecting pregnancy 08/19/2019    Allergy     PROM (premature rupture of membranes) 09/12/2019       PSHx:  Past Surgical History:   Procedure Laterality Date    ADENOIDECTOMY      SINUS SURGERY      SINUS SURGERY      tonsilectomy N/A 2007    TONSILLECTOMY         Fam Hx:   malignancies: No , gyn malignancies: No   kidney stones: No     Soc Hx:  , lives in [place]    Allergies:  Primsol [trimethoprim], Bactrim [sulfamethoxazole-trimethoprim], and Penicillins    Medications: reviewed     Objective:   There were no vitals filed for  this visit.    Physical Exam  Constitutional:       Appearance: Normal appearance.   Pulmonary:      Effort: Pulmonary effort is normal.   Abdominal:      General: There is no distension.      Palpations: Abdomen is soft.      Tenderness: There is no abdominal tenderness. There is no right CVA tenderness or left CVA tenderness.   Musculoskeletal:         General: Normal range of motion.      Cervical back: Normal range of motion.   Skin:     General: Skin is warm.   Neurological:      Mental Status: She is oriented to person, place, and time.   Psychiatric:         Mood and Affect: Mood normal.         Behavior: Behavior normal.           LABS REVIEW:  UA today:   Color:Clear, Yellow  Spec. Grav.  1.020  PH  7.5  Negative for leukocytes, glucose, ketones, urobili, bili, and blood.  Trace protein  Positive nitrites    Assessment:       1. Incomplete bladder emptying    2. Foul smelling urine    3. Urinary hesitancy          Plan:      Urine sent for culture  CT Urogram  Will obtain records from Dr. Robert's office    F/u Dr. Olmedo after CT Urogram    [facility]: active    JOE Rios

## 2024-12-12 ENCOUNTER — TELEPHONE (OUTPATIENT)
Dept: UROLOGY | Facility: CLINIC | Age: 29
End: 2024-12-12
Payer: COMMERCIAL

## 2024-12-12 ENCOUNTER — PATIENT MESSAGE (OUTPATIENT)
Dept: UROLOGY | Facility: CLINIC | Age: 29
End: 2024-12-12
Payer: COMMERCIAL

## 2024-12-12 DIAGNOSIS — N30.00 ACUTE CYSTITIS WITHOUT HEMATURIA: Primary | ICD-10-CM

## 2024-12-12 LAB — BACTERIA UR CULT: ABNORMAL

## 2024-12-12 RX ORDER — CIPROFLOXACIN 500 MG/1
500 TABLET ORAL EVERY 12 HOURS
Qty: 10 TABLET | Refills: 0 | Status: SHIPPED | OUTPATIENT
Start: 2024-12-12 | End: 2024-12-17

## 2024-12-12 NOTE — TELEPHONE ENCOUNTER
Pt verbalized understanding, QR----- Message from Renetta Stanton NP sent at 12/12/2024  8:39 AM CST -----  Abx sent to pharmacy, keep f/u with luis armando as planned

## 2024-12-18 NOTE — PROGRESS NOTES
ALLERGY & IMMUNOLOGY CLINIC -  NEW PATIENT     HISTORY OF PRESENT ILLNESS     Patient ID: Jolene Escalante is a 29 y.o. female    CC:   Chief Complaint   Patient presents with    Allergies       HPI: Jolene Escalante is a 29 y.o. female presents for evaluation of:    12/19/2024  Here to establish care today for history of possible immunoglobulin deficiency, concern of food allergy, and history of allergic rhinitis.  Has previously established care with several outside allergist and reports that she was found to be sensitized the multiple outdoor allergens for which he received 2 rounds of allergen immunotherapy for 1-2 years each time.  Did notice an improvement in symptoms and now only uses oral antihistamines and nasal steroids several times per year, most notably during the spring and fall seasons.  Around the time that she was also diagnosed with allergic rhinitis, was also told that she had both an IgG and IgA deficiency.  Recalls being frequently sick during that period of time and frequently needing ceftriaxone injections in the outpatient setting for treatment of sinusitis.  She denies pneumoniae and hospitalizations related to infectious history.  She received immunoglobulin repletion on several occasions and states that her IgG went up to an acceptable level so was discontinued thereafter.  She inquires today about association between autoimmune disease and immune deficiency.  She does not recall any specific diagnosis of an immune deficiency such as common variable immune deficiency.  She is being evaluated by rheumatologist as she has a family history of rheumatoid arthritis and has been noticing increasing amounts of joint pain over the previous year.  She additionally reports over the previous year that she has been experiencing food related reactions.  Has noticed with pecan and pumpkin flavoring that she will develop throat itching increased nasal congestion.  Has noticed on 2 separate  "occasions with cayenne pepper (during a crawfish boil and when consuming spicy chicken) that she developed dizziness, lightheadedness, facial flushing and sensation of throat tightness within 30 minutes following consumption.  Now wishes to strictly avoid at this time.  Has also noticed episodes of vomiting following consumption of both red and white wine in recent months.  Does not experience urticaria, angioedema or respiratory distress during these episodes.  Episodes following alcohol consumption normally occur hours after consumption.     REVIEW OF SYSTEMS     CONST: no F/C/NS, no unintentional weight changes  Balance of review of systems negative except as mentioned above     MEDICAL HISTORY     MedHx: active problems reviewed  SurgHx:   Past Surgical History:   Procedure Laterality Date    ADENOIDECTOMY      SINUS SURGERY      SINUS SURGERY      tonsilectomy N/A 2007    TONSILLECTOMY         SocHx:   -Smoking: Denies  -Pets: 2 Dogs  -Mold/Water Damage: denies    FamHx:   no Family History of asthma, allergic rhinitis, atopic dermatitis, drug allergy, food allergy, venom allergy or immune deficiency.     Allergies: see below  Medications: MAR reviewed       PHYSICAL EXAM     VS: Pulse 97   Ht 5' 7" (1.702 m)   Wt 68.7 kg (151 lb 7.3 oz)   LMP 12/01/2024 (Exact Date)   SpO2 97%   BMI 23.72 kg/m²   GENERAL: awake, alert, cooperative with exam  EYES: PERRL, EOMI, no conjunctival injection, no discharge, no infraorbital shiners  EARS: external auditory canals normal B/L  LUNGS: CTAB, no w/r/c, no increased WOB  HEART: Normal Rate and regular rhythm, normal S1/S2, no m/g/r  EXTREMITIES: +2 distal pulses, no c/c/e  DERM: no rashes, no skin breaks  NEURO: normal gait, no facial asymmetry     CHART REVIEW     ALLERGIES:  She reports allergies to Primsel (trimethoprim), Bactrim (mild, related to trimethoprim component), and penicillin. New food allergies within the past 5-6 months include pecans, pumpkin, and " wine. She reports an anaphylactic reaction to cayenne pepper, which has progressively worsened from initial facial swelling and sinus symptoms to a severe reaction requiring hospitalization after accidental exposure.     ASSESSMENT/PLAN     Jolene Escalante is a 29 y.o. female with       1. Immunoglobulin G deficiency    2. Immunoglobulin A deficiency    3. Food allergy    4. Chronic allergic rhinitis    5. Recurrent infections      Previous history of allergic rhinitis, immune deficiency, and possible food related reactions.  Has previously completed allergen immunotherapy with outside allergist for allergic rhinitis which is now well controlled with as needed oral antihistamines and nasal steroids.  Unclear specific diagnosis of immune deficiency, though previous records indicate that she received Pneumovax on 2 separate occasions and she has a history of low IgG and low IgA which could be consistent with common variable immune deficiency.  Today I will recheck her immunoglobulin isotypes as well as a humoral immune evaluation to fully assess.  Briefly discussed allergen immunotherapy but was deferred as symptoms are well controlled at this time.  Several food related reactions most concerning of which were related to cayenne pepper.  Unfortunately there is no standardized extract to assess so would need to pursue prick to prick testing with cayenne pepper to assess for presence of IgE mediated hypersensitivity reaction followed by an in office observed challenge.  Pumpkin and pecan localized reactions most consistent with possible pollen food allergy syndrome given concurrent allergic sensitization but we will check pumpkin and pecan specific IgE to fully assess.  Unclear causation of wine induced vomiting (tolerates grapes) related to wine.    Follow up: 4 months-Message with Results       Piotr Patel MD    I spent a total of 60 minutes on the day of the visit. This includes face to face time and  non-face to face time preparing to see the patient (eg, review of tests), obtaining and/or reviewing separately obtained history, documenting clinical information in the electronic or other health record, independently interpreting results and communicating results to the patient/family/caregiver, or care coordinator.

## 2024-12-19 ENCOUNTER — LAB VISIT (OUTPATIENT)
Dept: LAB | Facility: HOSPITAL | Age: 29
End: 2024-12-19
Attending: STUDENT IN AN ORGANIZED HEALTH CARE EDUCATION/TRAINING PROGRAM
Payer: COMMERCIAL

## 2024-12-19 ENCOUNTER — OFFICE VISIT (OUTPATIENT)
Dept: ALLERGY | Facility: CLINIC | Age: 29
End: 2024-12-19
Payer: COMMERCIAL

## 2024-12-19 ENCOUNTER — TELEPHONE (OUTPATIENT)
Dept: FAMILY MEDICINE | Facility: CLINIC | Age: 29
End: 2024-12-19
Payer: COMMERCIAL

## 2024-12-19 ENCOUNTER — PATIENT MESSAGE (OUTPATIENT)
Dept: FAMILY MEDICINE | Facility: CLINIC | Age: 29
End: 2024-12-19
Payer: COMMERCIAL

## 2024-12-19 VITALS — HEIGHT: 67 IN | BODY MASS INDEX: 23.77 KG/M2 | WEIGHT: 151.44 LBS | HEART RATE: 97 BPM | OXYGEN SATURATION: 97 %

## 2024-12-19 DIAGNOSIS — Z91.018 MULTIPLE FOOD ALLERGIES: ICD-10-CM

## 2024-12-19 DIAGNOSIS — Z91.018 FOOD ALLERGY: ICD-10-CM

## 2024-12-19 DIAGNOSIS — J30.9 CHRONIC ALLERGIC RHINITIS: ICD-10-CM

## 2024-12-19 DIAGNOSIS — D80.2 IMMUNOGLOBULIN A DEFICIENCY: ICD-10-CM

## 2024-12-19 DIAGNOSIS — D80.3 IMMUNOGLOBULIN G DEFICIENCY: Primary | ICD-10-CM

## 2024-12-19 DIAGNOSIS — B99.9 RECURRENT INFECTIONS: ICD-10-CM

## 2024-12-19 LAB
IGA SERPL-MCNC: 99 MG/DL (ref 40–350)
IGE SERPL-ACNC: 98 IU/ML (ref 0–100)
IGG SERPL-MCNC: 852 MG/DL (ref 650–1600)
IGM SERPL-MCNC: 257 MG/DL (ref 50–300)

## 2024-12-19 PROCEDURE — 3044F HG A1C LEVEL LT 7.0%: CPT | Mod: CPTII,S$GLB,, | Performed by: STUDENT IN AN ORGANIZED HEALTH CARE EDUCATION/TRAINING PROGRAM

## 2024-12-19 PROCEDURE — 1159F MED LIST DOCD IN RCRD: CPT | Mod: CPTII,S$GLB,, | Performed by: STUDENT IN AN ORGANIZED HEALTH CARE EDUCATION/TRAINING PROGRAM

## 2024-12-19 PROCEDURE — 86648 DIPHTHERIA ANTIBODY: CPT | Performed by: STUDENT IN AN ORGANIZED HEALTH CARE EDUCATION/TRAINING PROGRAM

## 2024-12-19 PROCEDURE — 86003 ALLG SPEC IGE CRUDE XTRC EA: CPT | Performed by: STUDENT IN AN ORGANIZED HEALTH CARE EDUCATION/TRAINING PROGRAM

## 2024-12-19 PROCEDURE — 99999 PR PBB SHADOW E&M-EST. PATIENT-LVL IV: CPT | Mod: PBBFAC,,, | Performed by: STUDENT IN AN ORGANIZED HEALTH CARE EDUCATION/TRAINING PROGRAM

## 2024-12-19 PROCEDURE — 99204 OFFICE O/P NEW MOD 45 MIN: CPT | Mod: S$GLB,,, | Performed by: STUDENT IN AN ORGANIZED HEALTH CARE EDUCATION/TRAINING PROGRAM

## 2024-12-19 PROCEDURE — 82784 ASSAY IGA/IGD/IGG/IGM EACH: CPT | Performed by: STUDENT IN AN ORGANIZED HEALTH CARE EDUCATION/TRAINING PROGRAM

## 2024-12-19 PROCEDURE — 86684 HEMOPHILUS INFLUENZA ANTIBDY: CPT | Performed by: STUDENT IN AN ORGANIZED HEALTH CARE EDUCATION/TRAINING PROGRAM

## 2024-12-19 PROCEDURE — 82785 ASSAY OF IGE: CPT | Performed by: STUDENT IN AN ORGANIZED HEALTH CARE EDUCATION/TRAINING PROGRAM

## 2024-12-19 PROCEDURE — 3008F BODY MASS INDEX DOCD: CPT | Mod: CPTII,S$GLB,, | Performed by: STUDENT IN AN ORGANIZED HEALTH CARE EDUCATION/TRAINING PROGRAM

## 2024-12-19 PROCEDURE — 86317 IMMUNOASSAY INFECTIOUS AGENT: CPT | Mod: 59 | Performed by: STUDENT IN AN ORGANIZED HEALTH CARE EDUCATION/TRAINING PROGRAM

## 2024-12-19 PROCEDURE — 86774 TETANUS ANTIBODY: CPT | Performed by: STUDENT IN AN ORGANIZED HEALTH CARE EDUCATION/TRAINING PROGRAM

## 2024-12-19 NOTE — TELEPHONE ENCOUNTER
----- Message from Ty sent at 12/19/2024 12:52 PM CST -----  Contact: Myra  Type:  Pharmacy Calling to Clarify an RX    Name of Caller:  Myra  Pharmacy Name:        [id number] - [place] [address] AT [facility] [address]  [address] [address] [address]  Phone: [phone] Fax: [phone]     Prescription Name:  EPINEPHrine (EPIPEN 2-TONE) 0.3 mg/0.3 mL AtIn  What do they need to clarify?:  directions  They would like a phone call.

## 2024-12-20 DIAGNOSIS — Z87.892 HISTORY OF ANAPHYLACTIC SHOCK: ICD-10-CM

## 2024-12-20 RX ORDER — EPINEPHRINE 0.3 MG/.3ML
1 INJECTION SUBCUTANEOUS ONCE
Qty: 0.3 ML | Refills: 11 | Status: SHIPPED | OUTPATIENT
Start: 2024-12-20 | End: 2024-12-20

## 2024-12-22 LAB — ARUP MISCELLANEOUS TEST 1: NORMAL

## 2024-12-23 LAB
PECAN/HICK NUT IGE QN: <0.1 KU/L
RAST CLASS: NORMAL

## 2024-12-24 LAB
C DIPHTHERIAE AB SER IA-ACNC: 0.57 IU/ML
C TETANI TOXOID AB SER-ACNC: 1.54 IU/ML
DEPRECATED S PNEUM12 IGG SER-MCNC: <0.3 UG/ML
DEPRECATED S PNEUM23 IGG SER-MCNC: 5.2 UG/ML
DEPRECATED S PNEUM4 IGG SER-MCNC: <0.3 UG/ML
DEPRECATED S PNEUM8 IGG SER-MCNC: 2.3 UG/ML
DEPRECATED S PNEUM9 IGG SER-MCNC: 0.7 UG/ML
S PN DA SERO 19F IGG SER-MCNC: <0.3 UG/ML
S PNEUM DA 1 IGG SER-MCNC: 1 UG/ML
S PNEUM DA 14 IGG SER-MCNC: <0.3
S PNEUM DA 18C IGG SER-MCNC: 0.4
S PNEUM DA 3 IGG SER-MCNC: 1.5 UG/ML
S PNEUM DA 5 IGG SER-MCNC: 3.6 UG/ML
S PNEUM DA 6B IGG SER-MCNC: 2.8 UG/ML
S PNEUM DA 7F IGG SER-MCNC: 0.4 UG/ML
S PNEUM DA 9V IGG SER-MCNC: 1.6 UG/ML

## 2024-12-26 LAB — ANNOTATION COMMENT IMP: NORMAL

## 2025-01-08 ENCOUNTER — LAB VISIT (OUTPATIENT)
Dept: LAB | Facility: HOSPITAL | Age: 30
End: 2025-01-08
Attending: INTERNAL MEDICINE
Payer: COMMERCIAL

## 2025-01-08 ENCOUNTER — OFFICE VISIT (OUTPATIENT)
Dept: HEMATOLOGY/ONCOLOGY | Facility: CLINIC | Age: 30
End: 2025-01-08
Payer: COMMERCIAL

## 2025-01-08 VITALS
HEART RATE: 104 BPM | HEIGHT: 67 IN | TEMPERATURE: 97 F | DIASTOLIC BLOOD PRESSURE: 82 MMHG | RESPIRATION RATE: 18 BRPM | OXYGEN SATURATION: 99 % | BODY MASS INDEX: 23.74 KG/M2 | SYSTOLIC BLOOD PRESSURE: 121 MMHG | WEIGHT: 151.25 LBS

## 2025-01-08 DIAGNOSIS — D80.2 IMMUNOGLOBULIN A DEFICIENCY: ICD-10-CM

## 2025-01-08 DIAGNOSIS — D72.829 LEUKOCYTOSIS, UNSPECIFIED TYPE: ICD-10-CM

## 2025-01-08 DIAGNOSIS — D80.3 IMMUNOGLOBULIN G DEFICIENCY: ICD-10-CM

## 2025-01-08 LAB
ALBUMIN SERPL BCP-MCNC: 4.5 G/DL (ref 3.5–5.2)
ALP SERPL-CCNC: 45 U/L (ref 40–150)
ALT SERPL W/O P-5'-P-CCNC: 32 U/L (ref 10–44)
ANION GAP SERPL CALC-SCNC: 10 MMOL/L (ref 8–16)
AST SERPL-CCNC: 24 U/L (ref 10–40)
BASOPHILS # BLD AUTO: 0.03 K/UL (ref 0–0.2)
BASOPHILS NFR BLD: 0.4 % (ref 0–1.9)
BILIRUB SERPL-MCNC: 0.3 MG/DL (ref 0.1–1)
BUN SERPL-MCNC: 14 MG/DL (ref 6–20)
CALCIUM SERPL-MCNC: 9.5 MG/DL (ref 8.7–10.5)
CHLORIDE SERPL-SCNC: 107 MMOL/L (ref 95–110)
CO2 SERPL-SCNC: 23 MMOL/L (ref 23–29)
CREAT SERPL-MCNC: 0.8 MG/DL (ref 0.5–1.4)
CRP SERPL-MCNC: 6.6 MG/L (ref 0–8.2)
DIFFERENTIAL METHOD BLD: ABNORMAL
EOSINOPHIL # BLD AUTO: 0.1 K/UL (ref 0–0.5)
EOSINOPHIL NFR BLD: 1.4 % (ref 0–8)
ERYTHROCYTE [DISTWIDTH] IN BLOOD BY AUTOMATED COUNT: 12.6 % (ref 11.5–14.5)
EST. GFR  (NO RACE VARIABLE): >60 ML/MIN/1.73 M^2
GLUCOSE SERPL-MCNC: 94 MG/DL (ref 70–110)
HCT VFR BLD AUTO: 39.6 % (ref 37–48.5)
HGB BLD-MCNC: 13.7 G/DL (ref 12–16)
IMM GRANULOCYTES # BLD AUTO: 0.02 K/UL (ref 0–0.04)
IMM GRANULOCYTES NFR BLD AUTO: 0.3 % (ref 0–0.5)
LYMPHOCYTES # BLD AUTO: 1.9 K/UL (ref 1–4.8)
LYMPHOCYTES NFR BLD: 26.1 % (ref 18–48)
MCH RBC QN AUTO: 32 PG (ref 27–31)
MCHC RBC AUTO-ENTMCNC: 34.6 G/DL (ref 32–36)
MCV RBC AUTO: 93 FL (ref 82–98)
MONOCYTES # BLD AUTO: 0.6 K/UL (ref 0.3–1)
MONOCYTES NFR BLD: 7.8 % (ref 4–15)
NEUTROPHILS # BLD AUTO: 4.7 K/UL (ref 1.8–7.7)
NEUTROPHILS NFR BLD: 64 % (ref 38–73)
NRBC BLD-RTO: 0 /100 WBC
PLATELET # BLD AUTO: 301 K/UL (ref 150–450)
PMV BLD AUTO: 9.9 FL (ref 9.2–12.9)
POTASSIUM SERPL-SCNC: 4.6 MMOL/L (ref 3.5–5.1)
PROT SERPL-MCNC: 7.6 G/DL (ref 6–8.4)
RBC # BLD AUTO: 4.28 M/UL (ref 4–5.4)
SODIUM SERPL-SCNC: 140 MMOL/L (ref 136–145)
WBC # BLD AUTO: 7.29 K/UL (ref 3.9–12.7)

## 2025-01-08 PROCEDURE — 80053 COMPREHEN METABOLIC PANEL: CPT | Mod: PN | Performed by: INTERNAL MEDICINE

## 2025-01-08 PROCEDURE — 99999 PR PBB SHADOW E&M-EST. PATIENT-LVL IV: CPT | Mod: PBBFAC,,, | Performed by: INTERNAL MEDICINE

## 2025-01-08 PROCEDURE — 85025 COMPLETE CBC W/AUTO DIFF WBC: CPT | Mod: PN | Performed by: INTERNAL MEDICINE

## 2025-01-08 PROCEDURE — 86140 C-REACTIVE PROTEIN: CPT | Performed by: INTERNAL MEDICINE

## 2025-01-08 RX ORDER — FEXOFENADINE HCL 60 MG/1
60 TABLET, FILM COATED ORAL DAILY
COMMUNITY

## 2025-01-08 RX ORDER — FLUTICASONE PROPIONATE 50 MCG
1 SPRAY, SUSPENSION (ML) NASAL DAILY
COMMUNITY

## 2025-01-08 NOTE — PROGRESS NOTES
Subjective     Patient ID: Jolene Escalante is a 29 y.o. female.    Chief Complaint: Other ([facility] ([facility]) - Leukocytosis/Naylor/Epic/)    HPI  Mrs. Escalante is a 29-year-old female referred for leukocytosis.  Most recent CBC is from 12/06/2024 and showed [place] 33166 per cubic mm, hemoglobin 13.7 grams/dL, hematocrit 40.3%, and platelets 362 K.  Differential shows 73% granulocytes, 19% lymphocytes and 5% monocytes.  The CBC immediately prior to that is from September of 2019 and had shown a white count of 82967 per cubic mm, hemoglobin 12.4 grams/dL, hematocrit 37.7%, MCV 96 and platelets 202 K.  Another CBC on 09/04/2019 had shown a white count of 81042 per cubic mm, hemoglobin 11.1 grams/dL, hematocrit 32.8%, MCV 95 and platelets 204 K.    She is referred for evaluation.        PAST MEDICAL HISTORY:  Unremarkable.  She has no chronic illnesses.  PAST SURGICAL HISTORY:  Significant for adenoidectomy, tonsillectomy, and unknown type of frontal sinus surgery as a child  SOCIAL HISTORY:  She is a homemaker.  She is .  She does not drink alcohol more than socially and she does not smoke.  FAMILY HISTORY:  Father had biliary duct cancer  GYN HISTORY:  Periods are every 25-28 days with the menstrual flow being 2-5 days at a time.  Flow is unpredictable in regards to its heaviness      Review of Systems  Overall she feels well.  Her main complaint is fatigue and also diffuse joint pains for which she has been seen by an allergist.  Recent workup including GISELL and rheumatoid factor was unremarkable.  She is somewhat anxious but she denies any depression, easy bruising, fevers, chills, night  sweats, weight loss, nausea, vomiting, diarrhea, constipation, diplopia, blurred vision, headache, chest pain, palpitations, shortness of breath, breast pain, abdominal pain, extremity pain, or difficulty ambulating.  The remainder of the ten-point ROS, including general, skin, lymph, H/N, cardiorespiratory, GI,  , Neuro, Endocrine, and psychiatric is negative.     Physical Exam     She is alert, oriented to time, place, person, pleasant, well      nourished, in no acute physical distress.                                    VITAL SIGNS:  Reviewed                                      HEENT:  Normal.  There are no nasal, oral, lip, gingival, auricular, lid,    or conjunctival lesions.  Mucosae are moist and pink, and there is no        thrush.  Pupils are equal, reactive to light and accommodation.              Extraocular muscle movements are intact.    Dentition is good.  There was no evidence of gingivitis.  There is no frontal or maxillary tenderness.                                   NECK:  Supple without JVD, adenopathy, or thyromegaly.                       LUNGS:  Clear to auscultation without wheezing, rales, or rhonchi.           CARDIOVASCULAR:  Reveals an S1, S2, no murmurs, no rubs, no gallops.         ABDOMEN:  Soft, nontender, without organomegaly.  Bowel sounds are    present.                                                                     EXTREMITIES:  No cyanosis, clubbing, or edema.                                                               LYMPHATIC:  There is no cervical, axillary, or supraclavicular adenopathy.   SKIN:  Warm and moist, without petechiae, rashes, induration, or ecchymoses.           NEUROLOGIC:  DTRs are 0-1+ bilaterally, symmetrical, motor function is 5/5,  and cranial nerves are  within normal limits.    ASSESSMENT  Borderline leukocytosis with a normal differential.  I suspect this is a mild leukemoid reaction    PLAN  I had a very long discussion with her.  We went over the differential for the leukocytosis.  At this point we will proceed as follows:  We will repeat the CBC today and I will review the peripheral smear  We will check CRP  If the WBCs are elevated on today's CBC, out of abundance of caution we will send a JAK2 mutational analysis to ascertain that she does not  have an MPN  I see no reason to check for BCR-ABL translocation at this point.  We will consider that if there is left shift on peripheral smear.      Her multiple questions were answered to her satisfaction.    I spent 60 minutes reviewing the available records and evaluating the patient, out of which over 50% of the time was spent face to face with the patient in counseling and coordinating this patient's care.    1:36 pm ADDENDUM   Today's CBC was reviewed.  Peripheral smear was reviewed.  WBCs are 7200 per cubic mm.  We will cancel the JAK2 mutational analysis.    Route Chart for Scheduling    Med Onc Chart Routing      Follow up with physician . Labs today.  I will see her in 3 weeks to discuss the results   Follow up with SHWETA    Infusion scheduling note    Injection scheduling note    Labs    Imaging    Pharmacy appointment    Other referrals

## 2025-03-31 ENCOUNTER — OFFICE VISIT (OUTPATIENT)
Dept: RHEUMATOLOGY | Facility: CLINIC | Age: 30
End: 2025-03-31
Payer: COMMERCIAL

## 2025-03-31 VITALS
HEART RATE: 108 BPM | BODY MASS INDEX: 23.74 KG/M2 | WEIGHT: 151.25 LBS | SYSTOLIC BLOOD PRESSURE: 152 MMHG | HEIGHT: 67 IN | OXYGEN SATURATION: 99 % | RESPIRATION RATE: 18 BRPM | DIASTOLIC BLOOD PRESSURE: 86 MMHG

## 2025-03-31 DIAGNOSIS — D83.9 CVID (COMMON VARIABLE IMMUNODEFICIENCY): ICD-10-CM

## 2025-03-31 DIAGNOSIS — M25.50 ARTHRALGIA, UNSPECIFIED JOINT: ICD-10-CM

## 2025-03-31 PROCEDURE — 99204 OFFICE O/P NEW MOD 45 MIN: CPT | Mod: S$GLB,,, | Performed by: STUDENT IN AN ORGANIZED HEALTH CARE EDUCATION/TRAINING PROGRAM

## 2025-03-31 PROCEDURE — 3008F BODY MASS INDEX DOCD: CPT | Mod: CPTII,S$GLB,, | Performed by: STUDENT IN AN ORGANIZED HEALTH CARE EDUCATION/TRAINING PROGRAM

## 2025-03-31 PROCEDURE — 3077F SYST BP >= 140 MM HG: CPT | Mod: CPTII,S$GLB,, | Performed by: STUDENT IN AN ORGANIZED HEALTH CARE EDUCATION/TRAINING PROGRAM

## 2025-03-31 PROCEDURE — 3079F DIAST BP 80-89 MM HG: CPT | Mod: CPTII,S$GLB,, | Performed by: STUDENT IN AN ORGANIZED HEALTH CARE EDUCATION/TRAINING PROGRAM

## 2025-03-31 PROCEDURE — 1159F MED LIST DOCD IN RCRD: CPT | Mod: CPTII,S$GLB,, | Performed by: STUDENT IN AN ORGANIZED HEALTH CARE EDUCATION/TRAINING PROGRAM

## 2025-03-31 PROCEDURE — 99999 PR PBB SHADOW E&M-EST. PATIENT-LVL IV: CPT | Mod: PBBFAC,,, | Performed by: STUDENT IN AN ORGANIZED HEALTH CARE EDUCATION/TRAINING PROGRAM

## 2025-03-31 NOTE — PROGRESS NOTES
RHEUMATOLOGY OUTPATIENT CLINIC NOTE    3/31/2025    Attending Rheumatologist: Krystal Cee  Primary Care Provider: Alba Naylor MD   Physician Requesting Consultation: Alba Naylor MD  [address] [place]  [place] [address]  Chief Complaint/Reason For Consultation:  Establish Care (Referral from PCP.)      Subjective:       HPI  Jolene Escalante is a 29 y.o. White female who comes for evaluation of joint pain.     About a year ago, she started noticing pain and stiffness in fingers, wrists, feet and hips. She has noted some swelling in fingers at times. She reports morning stiffness 1 hour.   She noticed significant fatigue despite full night sleep. Has poor energy.   Sometimes she wakes up with aches and feeling sick.   She has noted rashes in cheeks - it can be red but sometimes it is bumpy. She showed me pictures and it looks like malar rash. She reports that it correlated with arthralgias.  Has noted dry eyes, mouth and nose.   She has to use a lot of lubricant drops.   She drinks a lot of water. Drinks electrolytes drink   She has noted oral ulcers, they are painful.   She has noted hair loss. No alopecia. Hairline is thinning.   Noted some purple discoloration in fingertips but not very often   She has noted numbness in bilateral toes.   She has thoracic outlet syndrome - has 2 cervical ribs bilaterally.   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 She received IVIG probably 3 infusions. She follows with allergy/immunology due to above. 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   No illicit drug use.     Paternal grandmother with RA and PsA. Denies any family history of IBD     Labs  1/2025  CBC normal  CMP normal  CRP  negative    12/2024  RF negative  GISELL negative    Review of Systems   Constitutional:  Negative for fever and unexpected weight change.   HENT:  Positive for mouth sores and trouble swallowing.    Eyes:  Positive for redness.   Respiratory:  Negative for cough and shortness of breath.    Cardiovascular:  Negative for chest pain.   Gastrointestinal:  Negative for constipation and diarrhea.   Genitourinary:  Negative for dysuria and genital sores.   Integumentary:  Negative for rash.   Neurological:  Negative for headaches.   Hematological:  Does not bruise/bleed easily.        Chronic comorbid conditions affecting medical decision making today:  Past Medical History:   Diagnosis Date    Abdominal pain affecting pregnancy 08/19/2019    Allergy     PROM (premature rupture of membranes) 09/12/2019     Past Surgical History:   Procedure Laterality Date    ADENOIDECTOMY      SINUS SURGERY      SINUS SURGERY      tonsilectomy N/A 2007    TONSILLECTOMY       Family History   Problem Relation Name Age of Onset    Memory loss Mother      Diabetes Father Zev Jersey     Liver disease Father Zev Putnam     Liver cancer Father Zev Jersey         cholangiocarcinoma    Cancer Father Zev Jersey     Diabetes Maternal Grandfather Maurizio Mariscal     Hyperlipidemia Paternal Grandmother Lyudmila Putnam     Hypertension Paternal Grandmother Lyudmila Putnam     Arthritis Paternal Grandmother Lyudmila Putnam         rheumatoid arthritis    Rheum arthritis Paternal Grandmother Lyudmila Putnam     Diabetes Paternal Grandfather Bob Putnam     COPD Paternal Grandfather Bob Putnam     Kidney disease Maternal Aunt Lia     Hyperlipidemia Paternal Uncle      Hypertension Paternal Uncle      Breast cancer Neg Hx      Ovarian cancer Neg Hx       Social History     Substance and Sexual Activity   Alcohol Use Never     Tobacco Use History[1]  Social History     Substance and Sexual Activity   Drug Use Never     Current Medications[2]     Objective:         Vitals:     03/31/25 1408   BP: (!) 152/86   Pulse: 108   Resp: 18     Physical Exam   Constitutional: She is oriented to person, place, and time. She appears well-developed.   HENT:   Head: Normocephalic.   Mouth/Throat: Mucous membranes are moist.   Mouth/Throat: No ulcerations  Pulmonary/Chest: Effort normal.   Musculoskeletal:      Cervical back: Neck supple.      Comments: Cspine FROM no tenderness  Tspine FROM no tenderness  Lspine FROM no tenderness.  Shoulders: FROM; no synovitis;  Elbows: FROM; no synovitis; no tophi or nodules  Wrists: FROM; no synovitis;    MCPs: FROM; no synovitis;   PIPs:FROM; no synovitis;   DIPs: FROM; no synovitis;   HIPS: FROM  Knees: FROM; no synovitis; no instability  Ankles: FROM: no synovitis   Toes: no tenderness on palpation.      Lymphadenopathy:     She has no cervical adenopathy.   Neurological: She is alert and oriented to person, place, and time.   Skin: No rash noted.   No Heliotrope rash  No Gottron papules  No sclerodactyly   No Raynaud's  No Petechiae  No discoid lesions  No alopecia- +hair thinning  Normal Capillaroscopy   Vitals reviewed.      Reviewed old and all outside pertinent medical records available.    All lab results personally reviewed and interpreted by me.  Lab Results   Component Value Date    WBC 7.29 01/08/2025    HGB 13.7 01/08/2025    HCT 39.6 01/08/2025    MCV 93 01/08/2025    MCH 32.0 (H) 01/08/2025    MCHC 34.6 01/08/2025    RDW 12.6 01/08/2025     01/08/2025    MPV 9.9 01/08/2025    NEUTROABS 7,779 11/08/2017       Lab Results   Component Value Date     01/08/2025    K 4.6 01/08/2025     01/08/2025    CO2 23 01/08/2025    GLU 94 01/08/2025    BUN 14 01/08/2025    CALCIUM 9.5 01/08/2025    PROT 7.6 01/08/2025    ALBUMIN 4.5 01/08/2025    BILITOT 0.3 01/08/2025    AST 24 01/08/2025    ALKPHOS 45 01/08/2025    ALT 32 01/08/2025       Lab Results   Component Value Date    COLORU Yellow 12/09/2024    APPEARANCEUA Hazy (A) 09/04/2019     "SPECGRAV 1.020 12/09/2024    PHUR 7.5 12/09/2024    PROTEINUA Negative 09/04/2019    KETONESU Negative 12/09/2024    LEUKOCYTESUR 3+ (A) 09/04/2019    NITRITE Positive (A) 12/09/2024    UROBILINOGEN 0.2 12/09/2024       Lab Results   Component Value Date    CRP 6.6 01/08/2025       Lab Results   Component Value Date    RF <13.0 12/06/2024       No components found for: "25OHVITDTOT", "40QOVDHO9", "28HOZZKY3", "METHODNOTE"    No results found for: "URICACID"    Lab Results   Component Value Date    HEPBSAG Negative 01/14/2019     Imaging:  All imaging reviewed and independently interpreted by me.     ASSESSMENT / PLAN:     Jolene Escalante is a 29 y.o. White female with:    1. Arthralgia, unspecified joint  -she has been dealing with joint pain with swelling, oral ulcers, sicca symptoms, fatigue, malar rash for the past year or so.  -has had blood work done that showed negative RF, GISELL. CRP normal  -her symptoms are suspicious for inflammatory/autoimmune etiology.   -will check AVISE panel, ESR, CRP, hepatitis panel, RPR. Check vitamin D, b12, iron panel, SPEP  -check arthritis survey  -consider NM bone scan in the future.     2. CVID (common variable immunodeficiency)  -she has a history of CVID at age 11. Received IVIG.   -most recent immunoglobulins were normal.   -follows with allergy/immunology    Follow up in about 4 weeks (around 4/28/2025) for Virtual Visit.    Method of contact with patient concerns: Gaby attn Rheumatology    Disclaimer:  This note is prepared using voice recognition software and as such is likely to have errors and has not been proof read. Please contact me for questions.     Time spent: 45 minutes in face to face discussion concerning diagnosis, prognosis, review of lab and test results, benefits of treatment as well as management of disease, counseling of patient and coordination of care between various health care providers.      Krystal Cee, " M.D.  Rheumatology  [facility]          [1]   Social History  Tobacco Use   Smoking Status Never   Smokeless Tobacco Never   [2]   Current Outpatient Medications:     diclofenac sodium (VOLTAREN ARTHRITIS PAIN) 1 % Gel, Apply 2 g topically 4 (four) times daily., Disp: 100 g, Rfl: 5    fexofenadine (ALLEGRA) 60 MG tablet, Take 60 mg by mouth once daily., Disp: , Rfl:     fluticasone propionate (FLONASE) 50 mcg/actuation nasal spray, 1 spray by Each Nostril route once daily., Disp: , Rfl:     MULTIVITAMIN ORAL, Take 1 tablet by mouth once daily., Disp: , Rfl:     EPINEPHrine (EPIPEN 2-TONE) 0.3 mg/0.3 mL AtIn, Inject 0.3 mLs (0.3 mg total) into the muscle once. for 1 dose, Disp: 0.3 mL, Rfl: 11

## 2025-03-31 NOTE — PATIENT INSTRUCTIONS
Include these anti-Inflammatory foods:  ? Fresh vegetables (all kinds): Aim for a variety of types and colours (a rainbow  of veggies provides phytonutrients) - with a minimum of four to five servings  per day - especially dark, leafy greens (spinach, kale, Asian greens) broccoli  and cauliflower, Brussel sprouts, beetroot  ? Whole pieces of fruit (not juice that strips them of fibre): Three to four  servings per day is a good amount for most people, especially berries and  cherries  ? Fatty fish - such as salmon, trout, sardines, mackerel & tuna  ? Herbs & spices: turmeric, abhilash, basil, oregano, thyme, cinnamon etc.,  ? Healthy fats: extra virgin olive oil is the best option, avocado oil and the fats  found in seeds, nuts, fish  ? Nuts/seeds - such as walnuts, cashews, almonds, pistachios, pine nuts, prieto,  hemp  ? Whole grains - brown rice, amaranth, buckwheat, and quinoa  ? Legumes/beans: especially black beans, black-eyed peas, chickpeas, lentils,  red kidney  ? Drinks: water, green tea and organic coffee in moderation  Avoid these inflammatory / processed foods  ? Processed meats - sausages and cold cut meats - ham, salami etc.  ? Refined sugars - found in soft drinks, cookies, cake, lollies, ice cream, some  breakfast cereals  ? Trans fats - found in deep fried foods, fast foods, commercially baked goods  ? Processed snack foods - such as chips and crackers  ? Gluten, white bread & pasta & too many carbohydrates  ? Soybean oil and vegetable oil  ? Alcohol in excessive quantities  www.arthritisnsw.org.au  Easy Food Swaps & Meal Ideas  Breakfast  Instead of white bread (crumpets and English muffins), butter, jam, Nutella, processed  breakfast cereals, pancakes and evans, try:  ? Avocado, nut spread (not peanut), banana/cinnamon on rye bread  ? Have porridge (whole oats) & berries- juliana with a small amount of maple  syrup or honey  ? Organic scrambled / boiled eggs with herbs or turmeric  ? Avocado &  smoked salmon on whole grain bread  ? Granola with Greek yoghurt and berries  Lunch  Instead of hot chips, frozen meals, white bread sandwiches (cold cuts),  pasta/potato/Caesar salads try:  ? Vegetable frittata with salad  ? Salads with a variety of fruit and vegetables - also add brown rice, quinoa and  nuts to be more filling - dress with oil and vinegar and spices (not store-bought  dressings); replace lettuce with spinach  ? Morgan cakes & salad  ? Lettuce wraps with salmon or trout, herbs and carrot/cucumber  Dinner  Instead of pizza, pasta, hamburgers, red meat roasts try:  ? Try one-tray bakes with fish, lean chicken and vegies (less washing up!)  ? Have minestrone soup instead of pumpkin soup - use a variety of vegies & herbs  - count how many colours and types you can get in one soup  ? Replace side serves of pasta, white rice and potatoes with more veggies, salad  or whole grains  ? [place] fish and vegies on the BBQ and service with a salad  ? Asian soups with salmon and greens  Snacks  Instead of chips, biscuits, cakes, chocolate, try:  ? A handful of nuts - roast them or buy roasted for some extra crunch and flavour  ? Kale chips - kale, olive oil and a small sprinkle of salt & roast in the oven  ? Toasted prem spread, carrots, cucumber with hummus or avocado dip  ? Mixed berries and Greek yoghurt  ? Grab a piece of fruit  Drinks  Instead of soft drinks and excessive alcohol, try:  ? Water with a squeeze of citrus or flavoured with cut up fruit  ? Mineral water  ? Green tea and organic coffee in moderation

## 2025-04-04 ENCOUNTER — LAB VISIT (OUTPATIENT)
Dept: LAB | Facility: HOSPITAL | Age: 30
End: 2025-04-04
Payer: COMMERCIAL

## 2025-04-04 DIAGNOSIS — D83.9 CVID (COMMON VARIABLE IMMUNODEFICIENCY): ICD-10-CM

## 2025-04-04 DIAGNOSIS — M25.50 ARTHRALGIA, UNSPECIFIED JOINT: ICD-10-CM

## 2025-04-04 LAB
25(OH)D3+25(OH)D2 SERPL-MCNC: 15 NG/ML (ref 30–96)
CRP SERPL-MCNC: 11.7 MG/L
ERYTHROCYTE [SEDIMENTATION RATE] IN BLOOD BY PHOTOMETRIC METHOD: 18 MM/HR
FERRITIN SERPL-MCNC: 62 NG/ML (ref 20–300)
HBV CORE AB SERPL QL IA: NORMAL
HBV SURFACE AB SER-ACNC: <3 MIU/ML
HBV SURFACE AB SERPL IA-ACNC: NORMAL M[IU]/ML
HBV SURFACE AG SERPL QL IA: NORMAL
HCV AB SERPL QL IA: NORMAL
IRON SATN MFR SERPL: 16 % (ref 20–50)
IRON SERPL-MCNC: 60 UG/DL (ref 30–160)
T PALLIDUM IGG+IGM SER QL: NORMAL
TIBC SERPL-MCNC: 364 UG/DL (ref 250–450)
TRANSFERRIN SERPL-MCNC: 246 MG/DL (ref 200–375)
VIT B12 SERPL-MCNC: 617 PG/ML (ref 210–950)

## 2025-04-04 PROCEDURE — 86140 C-REACTIVE PROTEIN: CPT

## 2025-04-04 PROCEDURE — 87340 HEPATITIS B SURFACE AG IA: CPT

## 2025-04-04 PROCEDURE — 86334 IMMUNOFIX E-PHORESIS SERUM: CPT

## 2025-04-04 PROCEDURE — 86706 HEP B SURFACE ANTIBODY: CPT

## 2025-04-04 PROCEDURE — 85652 RBC SED RATE AUTOMATED: CPT

## 2025-04-04 PROCEDURE — 86704 HEP B CORE ANTIBODY TOTAL: CPT

## 2025-04-04 PROCEDURE — 82607 VITAMIN B-12: CPT

## 2025-04-04 PROCEDURE — 84165 PROTEIN E-PHORESIS SERUM: CPT

## 2025-04-04 PROCEDURE — 82306 VITAMIN D 25 HYDROXY: CPT

## 2025-04-04 PROCEDURE — 82728 ASSAY OF FERRITIN: CPT

## 2025-04-04 PROCEDURE — 86803 HEPATITIS C AB TEST: CPT

## 2025-04-04 PROCEDURE — 84466 ASSAY OF TRANSFERRIN: CPT

## 2025-04-04 PROCEDURE — 36415 COLL VENOUS BLD VENIPUNCTURE: CPT | Mod: PO

## 2025-04-04 PROCEDURE — 83521 IG LIGHT CHAINS FREE EACH: CPT

## 2025-04-04 PROCEDURE — 84165 PROTEIN E-PHORESIS SERUM: CPT | Mod: ,,, | Performed by: PATHOLOGY

## 2025-04-04 PROCEDURE — 86593 SYPHILIS TEST NON-TREP QUANT: CPT

## 2025-04-07 ENCOUNTER — PATIENT MESSAGE (OUTPATIENT)
Dept: RHEUMATOLOGY | Facility: CLINIC | Age: 30
End: 2025-04-07
Payer: COMMERCIAL

## 2025-04-07 LAB
ALBUMIN, SPE (OHS): 4.73 G/DL (ref 3.35–5.55)
ALPHA 1 GLOB (OHS): 0.27 GM/DL (ref 0.17–0.41)
ALPHA 2 GLOB (OHS): 0.69 GM/DL (ref 0.43–0.99)
BETA GLOB (OHS): 0.81 GM/DL (ref 0.5–1.1)
GAMMA GLOBULIN (OHS): 0.9 GM/DL (ref 0.67–1.58)
KAPPA LC FREE SER-MCNC: 1.55 MG/L (ref 0.26–1.65)
KAPPA LC FREE/LAMBDA FREE SER: 1.55 MG/DL (ref 0.33–1.94)
LAMBDA LC FREE SERPL-MCNC: 1 MG/DL (ref 0.57–2.63)
PROT SERPL-MCNC: 7.4 GM/DL (ref 6–8.4)

## 2025-04-08 LAB
PATHOLOGIST INTERPRETATION - IFE SERUM (OHS): NORMAL
PATHOLOGIST REVIEW - SPE (OHS): NORMAL

## 2025-04-09 ENCOUNTER — RESULTS FOLLOW-UP (OUTPATIENT)
Dept: RHEUMATOLOGY | Facility: CLINIC | Age: 30
End: 2025-04-09

## 2025-04-09 ENCOUNTER — PATIENT MESSAGE (OUTPATIENT)
Dept: RHEUMATOLOGY | Facility: CLINIC | Age: 30
End: 2025-04-09
Payer: COMMERCIAL

## 2025-04-09 ENCOUNTER — TELEPHONE (OUTPATIENT)
Dept: RHEUMATOLOGY | Facility: CLINIC | Age: 30
End: 2025-04-09
Payer: COMMERCIAL

## 2025-04-09 ENCOUNTER — HOSPITAL ENCOUNTER (OUTPATIENT)
Dept: RADIOLOGY | Facility: HOSPITAL | Age: 30
Discharge: HOME | End: 2025-04-09
Attending: STUDENT IN AN ORGANIZED HEALTH CARE EDUCATION/TRAINING PROGRAM
Payer: COMMERCIAL

## 2025-04-09 DIAGNOSIS — E55.9 VITAMIN D DEFICIENCY: ICD-10-CM

## 2025-04-09 DIAGNOSIS — D83.9 CVID (COMMON VARIABLE IMMUNODEFICIENCY): ICD-10-CM

## 2025-04-09 DIAGNOSIS — M25.50 ARTHRALGIA, UNSPECIFIED JOINT: Primary | ICD-10-CM

## 2025-04-09 DIAGNOSIS — M25.50 ARTHRALGIA, UNSPECIFIED JOINT: ICD-10-CM

## 2025-04-09 PROCEDURE — 77077 JOINT SURVEY SINGLE VIEW: CPT | Mod: TC,PO

## 2025-04-09 PROCEDURE — 77077 JOINT SURVEY SINGLE VIEW: CPT | Mod: 26,,, | Performed by: RADIOLOGY

## 2025-04-09 RX ORDER — CHOLECALCIFEROL (VITAMIN D3) 1250 MCG
1250 TABLET ORAL
Qty: 12 TABLET | Refills: 1 | Status: SHIPPED | OUTPATIENT
Start: 2025-04-09 | End: 2025-06-04 | Stop reason: ALTCHOICE

## 2025-04-09 NOTE — TELEPHONE ENCOUNTER
----- Message from Krystal Cee MD sent at 4/8/2025 12:41 PM CDT -----  Regarding: FW: EXAGEN AVISE CTD  I have ordered the AVISE kits and they should arrive to the clinic in 2-3 business days. Once arrived please send one kit to the address below via . Can contact the [place] lab for more information in the address. I will place another order for the AVISE test and once we receive confirmation that the lab has the kit, we can call the patient to go back to [facility].  ----- Message -----  From: Jenifer Guillermo  Sent: 4/4/2025   5:07 PM CDT  To: Krystal Cee MD  Subject: EXAGEN AVISE CTD                                 Good afternoon, Today Mrs. Escalante came in for bloodwork. Unfortunately we do not have the kit needed to do EXAGEN AVISE CTD in our region. She opted to get the rest of her bloodwork. The [place] location is the most convenient for her. In the event the kit is still needed, your clinic could call a STAT  to pick the kit and the filled out requisition up and deliver it to the [facility] at [address]  Another option is mailing the kit to the patient with a filled out requisition. The requisition could also be scanned into the media tab in her chart to ensure we have it.We apologize for any inconvenience.

## 2025-04-09 NOTE — TELEPHONE ENCOUNTER
Informed patient once we received confirmation from the lab we will contact her to return  back to [facility].

## 2025-04-30 ENCOUNTER — TELEPHONE (OUTPATIENT)
Dept: RHEUMATOLOGY | Facility: CLINIC | Age: 30
End: 2025-04-30

## 2025-04-30 NOTE — TELEPHONE ENCOUNTER
Agatha stated that they did receive the patient kit that was mailed, but the patient did not come in. Provider informed.

## 2025-05-12 ENCOUNTER — LAB VISIT (OUTPATIENT)
Dept: LAB | Facility: HOSPITAL | Age: 30
End: 2025-05-12
Attending: STUDENT IN AN ORGANIZED HEALTH CARE EDUCATION/TRAINING PROGRAM
Payer: COMMERCIAL

## 2025-05-12 DIAGNOSIS — M25.50 ARTHRALGIA, UNSPECIFIED JOINT: ICD-10-CM

## 2025-05-12 DIAGNOSIS — D83.9 CVID (COMMON VARIABLE IMMUNODEFICIENCY): ICD-10-CM

## 2025-05-12 PROCEDURE — 36415 COLL VENOUS BLD VENIPUNCTURE: CPT | Mod: PO

## 2025-06-04 ENCOUNTER — HOSPITAL ENCOUNTER (OUTPATIENT)
Dept: RADIOLOGY | Facility: HOSPITAL | Age: 30
Discharge: HOME | End: 2025-06-04
Attending: NURSE PRACTITIONER
Payer: COMMERCIAL

## 2025-06-04 ENCOUNTER — OFFICE VISIT (OUTPATIENT)
Dept: FAMILY MEDICINE | Facility: CLINIC | Age: 30
End: 2025-06-04
Payer: COMMERCIAL

## 2025-06-04 VITALS
HEART RATE: 115 BPM | WEIGHT: 151.25 LBS | HEIGHT: 67 IN | OXYGEN SATURATION: 98 % | BODY MASS INDEX: 23.74 KG/M2 | SYSTOLIC BLOOD PRESSURE: 126 MMHG | DIASTOLIC BLOOD PRESSURE: 82 MMHG | TEMPERATURE: 98 F

## 2025-06-04 DIAGNOSIS — M25.572 ACUTE LEFT ANKLE PAIN: ICD-10-CM

## 2025-06-04 DIAGNOSIS — M25.572 ACUTE LEFT ANKLE PAIN: Primary | ICD-10-CM

## 2025-06-04 DIAGNOSIS — S93.402A SPRAIN OF LEFT ANKLE, UNSPECIFIED LIGAMENT, INITIAL ENCOUNTER: ICD-10-CM

## 2025-06-04 PROCEDURE — 99999 PR PBB SHADOW E&M-EST. PATIENT-LVL IV: CPT | Mod: PBBFAC,,, | Performed by: NURSE PRACTITIONER

## 2025-06-04 PROCEDURE — 3008F BODY MASS INDEX DOCD: CPT | Mod: CPTII,S$GLB,, | Performed by: NURSE PRACTITIONER

## 2025-06-04 PROCEDURE — 99213 OFFICE O/P EST LOW 20 MIN: CPT | Mod: S$GLB,,, | Performed by: NURSE PRACTITIONER

## 2025-06-04 PROCEDURE — 73610 X-RAY EXAM OF ANKLE: CPT | Mod: 26,LT,, | Performed by: RADIOLOGY

## 2025-06-04 PROCEDURE — 3079F DIAST BP 80-89 MM HG: CPT | Mod: CPTII,S$GLB,, | Performed by: NURSE PRACTITIONER

## 2025-06-04 PROCEDURE — 1159F MED LIST DOCD IN RCRD: CPT | Mod: CPTII,S$GLB,, | Performed by: NURSE PRACTITIONER

## 2025-06-04 PROCEDURE — 3074F SYST BP LT 130 MM HG: CPT | Mod: CPTII,S$GLB,, | Performed by: NURSE PRACTITIONER

## 2025-06-04 PROCEDURE — 73610 X-RAY EXAM OF ANKLE: CPT | Mod: TC,FY,PO,LT

## 2025-06-04 PROCEDURE — 1160F RVW MEDS BY RX/DR IN RCRD: CPT | Mod: CPTII,S$GLB,, | Performed by: NURSE PRACTITIONER

## 2025-06-04 RX ORDER — IBUPROFEN 800 MG/1
800 TABLET, FILM COATED ORAL EVERY 8 HOURS PRN
Qty: 30 TABLET | Refills: 0 | Status: SHIPPED | OUTPATIENT
Start: 2025-06-04

## 2025-06-05 ENCOUNTER — TELEPHONE (OUTPATIENT)
Dept: FAMILY MEDICINE | Facility: CLINIC | Age: 30
End: 2025-06-05
Payer: COMMERCIAL

## 2025-06-05 ENCOUNTER — RESULTS FOLLOW-UP (OUTPATIENT)
Dept: FAMILY MEDICINE | Facility: CLINIC | Age: 30
End: 2025-06-05

## 2025-06-05 DIAGNOSIS — S93.402A SPRAIN OF LEFT ANKLE, UNSPECIFIED LIGAMENT, INITIAL ENCOUNTER: ICD-10-CM

## 2025-06-05 DIAGNOSIS — M79.672 LEFT FOOT PAIN: ICD-10-CM

## 2025-06-05 DIAGNOSIS — R93.6 ABNORMAL X-RAY OF EXTREMITY: Primary | ICD-10-CM

## 2025-06-09 DIAGNOSIS — M79.672 LEFT FOOT PAIN: ICD-10-CM

## 2025-06-09 DIAGNOSIS — M25.572 LEFT ANKLE PAIN: Primary | ICD-10-CM

## 2025-06-10 ENCOUNTER — HOSPITAL ENCOUNTER (OUTPATIENT)
Dept: RADIOLOGY | Facility: HOSPITAL | Age: 30
Discharge: HOME | End: 2025-06-10
Attending: ORTHOPAEDIC SURGERY
Payer: COMMERCIAL

## 2025-06-10 ENCOUNTER — OFFICE VISIT (OUTPATIENT)
Dept: ORTHOPEDICS | Facility: CLINIC | Age: 30
End: 2025-06-10
Payer: COMMERCIAL

## 2025-06-10 DIAGNOSIS — R93.6 ABNORMAL X-RAY OF EXTREMITY: ICD-10-CM

## 2025-06-10 DIAGNOSIS — S93.412A SPRAIN OF CALCANEOFIBULAR LIGAMENT OF LEFT ANKLE, INITIAL ENCOUNTER: Primary | ICD-10-CM

## 2025-06-10 DIAGNOSIS — S93.402A SPRAIN OF LEFT ANKLE, UNSPECIFIED LIGAMENT, INITIAL ENCOUNTER: ICD-10-CM

## 2025-06-10 DIAGNOSIS — M79.672 LEFT FOOT PAIN: ICD-10-CM

## 2025-06-10 DIAGNOSIS — M25.572 LEFT ANKLE PAIN: ICD-10-CM

## 2025-06-10 PROCEDURE — 99999 PR PBB SHADOW E&M-EST. PATIENT-LVL III: CPT | Mod: PBBFAC,,, | Performed by: ORTHOPAEDIC SURGERY

## 2025-06-10 PROCEDURE — 73630 X-RAY EXAM OF FOOT: CPT | Mod: 26,LT,, | Performed by: RADIOLOGY

## 2025-06-10 PROCEDURE — 99203 OFFICE O/P NEW LOW 30 MIN: CPT | Mod: S$GLB,,, | Performed by: ORTHOPAEDIC SURGERY

## 2025-06-10 PROCEDURE — 73610 X-RAY EXAM OF ANKLE: CPT | Mod: TC,PO,LT

## 2025-06-10 PROCEDURE — 73610 X-RAY EXAM OF ANKLE: CPT | Mod: 26,LT,, | Performed by: RADIOLOGY

## 2025-06-10 PROCEDURE — 1159F MED LIST DOCD IN RCRD: CPT | Mod: CPTII,S$GLB,, | Performed by: ORTHOPAEDIC SURGERY

## 2025-06-10 PROCEDURE — 73630 X-RAY EXAM OF FOOT: CPT | Mod: TC,PO,LT

## 2025-06-10 NOTE — PROGRESS NOTES
Status/Diagnosis: Left ankle ATFL/CFL sprain; 5th MT base contusion.  Date of Surgery: none  Date of Injury: 06/03/2025  Return visit: 2 weeks  X-rays on Return: WB 3-views Left ankle    Chief Complaint: Left    Present History:  Patient presents today via referral from Jeannette Murrieta   Jolene Mary Escalante is a 29 y.o. female with acute onset left lateral foot/ankle pain.  Reports she was standing in bed when she had a syncopal episode.  Describes what sounds like an inversion-type left foot/ankle injury.  Immediate pain, swelling, inability to bear weight.  Subsequently seen in the ED at which time x-rays were taken and read as negative.  She was given crutches and placed into a splint.  She has been nonweightbearing since time of injury.  Denies any left ankle pain or instability prior to the above.    6/10 pain today.  Intermittent numbness and tingling.  Otherwise healthy.  Denies tobacco use.      Past Medical History:   Diagnosis Date    Abdominal pain affecting pregnancy 08/19/2019    Allergy     PROM (premature rupture of membranes) 09/12/2019       Past Surgical History:   Procedure Laterality Date    ADENOIDECTOMY      SINUS SURGERY      SINUS SURGERY      tonsilectomy N/A 2007    TONSILLECTOMY         Current Outpatient Medications   Medication Sig    diclofenac sodium (VOLTAREN ARTHRITIS PAIN) 1 % Gel Apply 2 g topically 4 (four) times daily.    EPINEPHrine (EPIPEN 2-TONE) 0.3 mg/0.3 mL AtIn Inject 0.3 mLs (0.3 mg total) into the muscle once. for 1 dose    fexofenadine (ALLEGRA) 60 MG tablet Take 60 mg by mouth once daily.    fluticasone propionate (FLONASE) 50 mcg/actuation nasal spray 1 spray by Each Nostril route once daily.    ibuprofen (ADVIL,MOTRIN) 800 MG tablet Take 1 tablet (800 mg total) by mouth every 8 (eight) hours as needed for Pain.    MULTIVITAMIN ORAL Take 1 tablet by mouth once daily.     No current facility-administered medications for this visit.       Review of patient's  allergies indicates:   Allergen Reactions    Primsol [trimethoprim] Rash     MADE PT'S HAIR FALL OUT AND THROAT SWELL    Bactrim [sulfamethoxazole-trimethoprim] Nausea And Vomiting    Penicillins Rash       Family History   Problem Relation Name Age of Onset    Memory loss Mother      Diabetes Father Zev Putnam     Liver disease Father Zev Putnam     Liver cancer Father Zev Putnam         cholangiocarcinoma    Cancer Father Zev Putnam     Diabetes Maternal Grandfather Maurizio Mariscal     Hyperlipidemia Paternal Grandmother Lyudmila Putnam     Hypertension Paternal Grandmother Lyudmila Putnam     Arthritis Paternal Grandmother Lyudmila Putnam         rheumatoid arthritis    Rheum arthritis Paternal Grandmother Lyudmila Putnam     Diabetes Paternal Grandfather Bob Putnam     COPD Paternal Grandfather Bob Putnam     Kidney disease Maternal Aunt Lia     Hyperlipidemia Paternal Uncle      Hypertension Paternal Uncle      Breast cancer Neg Hx      Ovarian cancer Neg Hx         Social History[1]    Physical exam:  There were no vitals filed for this visit.  There is no height or weight on file to calculate BMI.  General: In no apparent distress; well developed and well nourished.  HEENT: normocephalic; atraumatic.  Cardiovascular: regular rate.  Respiratory: no increased work of breathing.  Musculoskeletal:   Gait: antalgic  Inspection:   Moderate residual swelling about the lateral ankle with distal extension of the dorsal midfoot.  Exquisite tenderness on deep palpation at the CFL > ATFL.  Mild tenderness of the 5th metatarsal base but to a lesser degree.  Equivocal laxity on anterior drawer and varus/valgus talar tilt testing due to patient pain/guarding.  No medial based ankle swelling or tenderness noted.  Silfverskiold: Negative  Alignment:  Knee: neutral               Ankle: neutral              Hindfoot: neutral              Forefoot: neutral   Strength:             4+/5 throughout due to patient pain/guarding  Sensation:               SILT distally  ROM:              Ankle: near full with pain at extremes              Subtalar: near full with pain at extremes  Pulses: Palpable pedal pulse                   Imaging Studies/Outside documentation:  I have ordered/reviewed/interpreted the following images/outside documentation:  1. Weight-bearing 3-views of Left foot and ankle:   On my independent review, no acute bony abnormality noted.  Moderate increased calcaneal pitch with talonavicular over coverage consistent with cavovarus. (+) humpback deformity.  Type 1 accessory navicular.  Ankle mortise remains congruent.  No significant degenerative joint changes.        Assessment:  Jolene Escalante is a 29 y.o. female with Left ankle ATFL/CFL sprain; 5th MT base contusion.     Plan:   Clinical and radiographic findings were discussed.  No evidence of fracture on x-ray today.  Patient has been essentially nonweightbearing since time of injury.    We will place her into a tall cam boot.    Weightbear as tolerated left lower extremity.  May wean off crutches as pain allows.    Discussed rest, ice, compression elevation, over-the-counter oral NSAIDs as needed for pain.    Patient voiced understanding all questions were answered.  Follow up in 2 weeks for repeat evaluation, or sooner if needed.    Patient voiced understanding all questions were answered.    We performed a custom orthotic/brace fitting, adjusting and training with the patient. The patient demonstrated understanding and proper care. This was performed for 15 minutes.    This note was created using voice recognition software and may contain grammatical errors.         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     Social Drivers of Health     Financial Resource Strain: Low Risk  (6/4/2025)    Overall Financial Resource Strain (CARDIA)     Difficulty of Paying Living Expenses: Not hard at all   Food Insecurity: No Food Insecurity (6/4/2025)    Hunger Vital Sign     Worried About Running Out of Food in the Last Year: Never true     Ran Out of Food in the Last Year: Never true   Transportation Needs: No Transportation Needs (6/4/2025)    PRAPARE - Transportation     Lack of Transportation (Medical): No     Lack of Transportation (Non-Medical): No   Physical Activity: Sufficiently Active (6/4/2025)    Exercise Vital Sign     Days of Exercise per Week: 5 days     Minutes of Exercise per Session: 80 min   Stress: No Stress Concern Present (6/4/2025)    [employer] [place] [employer] - Occupational Stress Questionnaire     Feeling of Stress : Not at all   Housing Stability: Low Risk  (6/4/2025)    Housing Stability Vital Sign     Unable to Pay for Housing in the Last Year: No     Homeless in the Last Year: No

## 2025-06-23 ENCOUNTER — PATIENT MESSAGE (OUTPATIENT)
Dept: RHEUMATOLOGY | Facility: CLINIC | Age: 30
End: 2025-06-23
Payer: COMMERCIAL

## 2025-06-23 DIAGNOSIS — S93.402A SPRAIN OF LEFT ANKLE, UNSPECIFIED LIGAMENT, INITIAL ENCOUNTER: Primary | ICD-10-CM

## 2025-06-24 ENCOUNTER — OFFICE VISIT (OUTPATIENT)
Dept: ORTHOPEDICS | Facility: CLINIC | Age: 30
End: 2025-06-24
Payer: COMMERCIAL

## 2025-06-24 ENCOUNTER — HOSPITAL ENCOUNTER (OUTPATIENT)
Dept: RADIOLOGY | Facility: HOSPITAL | Age: 30
Discharge: HOME | End: 2025-06-24
Attending: ORTHOPAEDIC SURGERY
Payer: COMMERCIAL

## 2025-06-24 DIAGNOSIS — S86.312A STRAIN OF MUSCLE(S) AND TENDON(S) OF PERONEAL MUSCLE GROUP AT LOWER LEG LEVEL, LEFT LEG, INITIAL ENCOUNTER: ICD-10-CM

## 2025-06-24 DIAGNOSIS — S93.412A SPRAIN OF CALCANEOFIBULAR LIGAMENT OF LEFT ANKLE, INITIAL ENCOUNTER: Primary | ICD-10-CM

## 2025-06-24 DIAGNOSIS — S93.402A SPRAIN OF LEFT ANKLE, UNSPECIFIED LIGAMENT, INITIAL ENCOUNTER: ICD-10-CM

## 2025-06-24 PROCEDURE — 99213 OFFICE O/P EST LOW 20 MIN: CPT | Mod: S$GLB,,, | Performed by: ORTHOPAEDIC SURGERY

## 2025-06-24 PROCEDURE — 73610 X-RAY EXAM OF ANKLE: CPT | Mod: TC,PO,LT

## 2025-06-24 PROCEDURE — 99999 PR PBB SHADOW E&M-EST. PATIENT-LVL III: CPT | Mod: PBBFAC,,, | Performed by: ORTHOPAEDIC SURGERY

## 2025-06-24 PROCEDURE — 73610 X-RAY EXAM OF ANKLE: CPT | Mod: 26,LT,, | Performed by: RADIOLOGY

## 2025-06-24 PROCEDURE — 1159F MED LIST DOCD IN RCRD: CPT | Mod: CPTII,S$GLB,, | Performed by: ORTHOPAEDIC SURGERY

## 2025-06-25 NOTE — PROGRESS NOTES
Status/Diagnosis: Left ankle ATFL/CFL sprain; Peroneal tendon strain. 5th MT base contusion.  Date of Surgery: none  Date of Injury: 06/03/2025  Return visit: PRN  X-rays on Return: pending patient complaint    Chief Complaint: Left    Present History:  Patient presents today via referral from No ref. provider found   Jolene Escalante is a 29 y.o. female with acute onset left lateral foot/ankle pain.  Reports she was standing in bed when she had a syncopal episode.  Describes what sounds like an inversion-type left foot/ankle injury.  Immediate pain, swelling, inability to bear weight.  Subsequently seen in the ED at which time x-rays were taken and read as negative.  She was given crutches and placed into a splint.  She has been nonweightbearing since time of injury.  Denies any left ankle pain or instability prior to the above.    6/10 pain today.  Intermittent numbness and tingling.  Otherwise healthy.  Denies tobacco use.    06/24/2025:  Patient returns for repeat clinical evaluation.  Moderate improvement in regard to pain since last being seen.  She has been full weight-bearing in the tall cam boot since 06/21.  Little to no residual pain over the 5th metatarsal.  Still with moderate persistent pain at the ATFL and along the course of the peroneals.      Past Medical History:   Diagnosis Date    Abdominal pain affecting pregnancy 08/19/2019    Allergy     PROM (premature rupture of membranes) 09/12/2019       Past Surgical History:   Procedure Laterality Date    ADENOIDECTOMY      SINUS SURGERY      SINUS SURGERY      tonsilectomy N/A 2007    TONSILLECTOMY         Current Outpatient Medications   Medication Sig    diclofenac sodium (VOLTAREN ARTHRITIS PAIN) 1 % Gel Apply 2 g topically 4 (four) times daily.    fexofenadine (ALLEGRA) 60 MG tablet Take 60 mg by mouth once daily.    fluticasone propionate (FLONASE) 50 mcg/actuation nasal spray 1 spray by Each Nostril route once daily.    ibuprofen  (ADVIL,MOTRIN) 800 MG tablet Take 1 tablet (800 mg total) by mouth every 8 (eight) hours as needed for Pain.    MULTIVITAMIN ORAL Take 1 tablet by mouth once daily.    EPINEPHrine (EPIPEN 2-TONE) 0.3 mg/0.3 mL AtIn Inject 0.3 mLs (0.3 mg total) into the muscle once. for 1 dose     No current facility-administered medications for this visit.       Review of patient's allergies indicates:   Allergen Reactions    Primsol [trimethoprim] Rash     MADE PT'S HAIR FALL OUT AND THROAT SWELL    Bactrim [sulfamethoxazole-trimethoprim] Nausea And Vomiting    Penicillins Rash       Family History   Problem Relation Name Age of Onset    Memory loss Mother      Diabetes Father Zev Putnam     Liver disease Father Zev Putnam     Liver cancer Father Zev Putnam         cholangiocarcinoma    Cancer Father Zev Putnam     Diabetes Maternal Grandfather Marlicleon Mariscal     Hyperlipidemia Paternal Grandmother Lyudmila Putnam     Hypertension Paternal Grandmother Lyudmila Putnam     Arthritis Paternal Grandmother Lyudmila Putnam         rheumatoid arthritis    Rheum arthritis Paternal Grandmother Lyudmila Putnam     Diabetes Paternal Grandfather Bob Putnam     COPD Paternal Grandfather Bob Putnam     Kidney disease Maternal Aunt Lia     Hyperlipidemia Paternal Uncle      Hypertension Paternal Uncle      Breast cancer Neg Hx      Ovarian cancer Neg Hx         Social History[1]    Physical exam:  There were no vitals filed for this visit.  There is no height or weight on file to calculate BMI.  General: In no apparent distress; well developed and well nourished.  HEENT: normocephalic; atraumatic.  Cardiovascular: regular rate.  Respiratory: no increased work of breathing.  Musculoskeletal:   Gait: mild antalgic  Inspection:   Mild-to-moderate residual swelling about the lateral ankle.  Tenderness at the ATFL and CFL.  Little to no residual tenderness of the 5th metatarsal base.  Mild tenderness along the course of the peroneals.  5/5 brevis strength.  No amanda  laxity on anterior drawer and varus/valgus talar tilt testing.  No medial based ankle swelling or tenderness noted.  Silfverskiold: Negative  Alignment:  Knee: neutral               Ankle: neutral              Hindfoot: neutral              Forefoot: neutral   Strength:             4+/5 throughout   Sensation:              SILT distally  ROM:              Ankle: near full with pain at extremes              Subtalar: near full with pain at extremes  Pulses: Palpable pedal pulse                   Imaging Studies/Outside documentation:  I have ordered/reviewed/interpreted the following images/outside documentation:  1. Weight-bearing 3-views of Left ankle:   On my independent review, no acute bony abnormality noted.  Moderate increased calcaneal pitch with talonavicular over coverage consistent with cavovarus. (+) humpback deformity.  Type 1 accessory navicular.  Ankle mortise remains congruent.  No significant degenerative joint changes.        Assessment:  Jolene Escalante is a 29 y.o. female with Left ankle ATFL/CFL sprain; 5th MT base contusion.     Plan:   Clinical and radiographic findings were discussed.   Moderate clinical improvement since last being seen.    Patient to continue full weight-bearing in his tall cam boot for the next week.  Initiate physical therapy per protocol starting next week.    Okay to graduate out of the boot going forward with therapy.    Patient voiced understanding.  All questions were answered.  She will follow up on an as-needed basis.      This note was created using voice recognition software and may contain grammatical errors.           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     Social Drivers of Health     Financial Resource Strain: Low Risk  (6/4/2025)    Overall Financial Resource Strain (CARDIA)     Difficulty of Paying Living Expenses: Not hard at all   Food Insecurity: No Food Insecurity (6/4/2025)    Hunger Vital Sign     Worried About Running Out of Food in the Last Year: Never true     Ran Out of Food in the Last Year: Never true   Transportation Needs: No Transportation Needs (6/4/2025)    PRAPARE - Transportation     Lack of Transportation (Medical): No     Lack of Transportation (Non-Medical): No   Physical Activity: Sufficiently Active (6/4/2025)    Exercise Vital Sign     Days of Exercise per Week: 5 days     Minutes of Exercise per Session: 80 min   Stress: No Stress Concern Present (6/4/2025)    [employer] [place] [employer] - Occupational Stress Questionnaire     Feeling of Stress : Not at all   Housing Stability: Low Risk  (6/4/2025)    Housing Stability Vital Sign     Unable to Pay for Housing in the Last Year: No     Homeless in the Last Year: No

## 2025-08-13 ENCOUNTER — OFFICE VISIT (OUTPATIENT)
Facility: CLINIC | Age: 30
End: 2025-08-13
Payer: COMMERCIAL

## 2025-08-13 DIAGNOSIS — D83.9 CVID (COMMON VARIABLE IMMUNODEFICIENCY): ICD-10-CM

## 2025-08-13 DIAGNOSIS — E55.9 VITAMIN D DEFICIENCY: ICD-10-CM

## 2025-08-13 DIAGNOSIS — M35.9 UNDIFFERENTIATED CONNECTIVE TISSUE DISEASE: Primary | ICD-10-CM

## 2025-08-13 RX ORDER — CHOLECALCIFEROL (VITAMIN D3) 1250 MCG
1250 TABLET ORAL
Qty: 12 TABLET | Refills: 1 | Status: SHIPPED | OUTPATIENT
Start: 2025-08-13

## 2025-09-04 ENCOUNTER — PATIENT MESSAGE (OUTPATIENT)
Facility: CLINIC | Age: 30
End: 2025-09-04
Payer: COMMERCIAL

## 2025-09-04 DIAGNOSIS — M35.9 UNDIFFERENTIATED CONNECTIVE TISSUE DISEASE: Primary | ICD-10-CM

## 2025-09-04 RX ORDER — HYDROXYCHLOROQUINE SULFATE 200 MG/1
300 TABLET, FILM COATED ORAL DAILY
Qty: 45 TABLET | Refills: 5 | Status: SHIPPED | OUTPATIENT
Start: 2025-09-04